# Patient Record
Sex: FEMALE | Race: WHITE | NOT HISPANIC OR LATINO | URBAN - METROPOLITAN AREA
[De-identification: names, ages, dates, MRNs, and addresses within clinical notes are randomized per-mention and may not be internally consistent; named-entity substitution may affect disease eponyms.]

---

## 2018-03-21 ENCOUNTER — OUTPATIENT (OUTPATIENT)
Dept: OUTPATIENT SERVICES | Facility: HOSPITAL | Age: 38
LOS: 1 days | End: 2018-03-21
Payer: MEDICAID

## 2018-03-21 DIAGNOSIS — K40.90 UNILATERAL INGUINAL HERNIA, WITHOUT OBSTRUCTION OR GANGRENE, NOT SPECIFIED AS RECURRENT: ICD-10-CM

## 2018-03-21 DIAGNOSIS — Z01.818 ENCOUNTER FOR OTHER PREPROCEDURAL EXAMINATION: ICD-10-CM

## 2018-03-21 DIAGNOSIS — K43.9 VENTRAL HERNIA WITHOUT OBSTRUCTION OR GANGRENE: ICD-10-CM

## 2018-03-21 PROCEDURE — G0463: CPT

## 2018-03-21 PROCEDURE — 85027 COMPLETE CBC AUTOMATED: CPT

## 2018-03-21 PROCEDURE — 36415 COLL VENOUS BLD VENIPUNCTURE: CPT

## 2018-04-04 ENCOUNTER — OUTPATIENT (OUTPATIENT)
Dept: OUTPATIENT SERVICES | Facility: HOSPITAL | Age: 38
LOS: 1 days | End: 2018-04-04
Payer: MEDICAID

## 2018-04-04 DIAGNOSIS — K43.9 VENTRAL HERNIA WITHOUT OBSTRUCTION OR GANGRENE: ICD-10-CM

## 2018-04-04 DIAGNOSIS — K40.90 UNILATERAL INGUINAL HERNIA, WITHOUT OBSTRUCTION OR GANGRENE, NOT SPECIFIED AS RECURRENT: ICD-10-CM

## 2018-04-04 PROCEDURE — 49560: CPT

## 2018-04-04 PROCEDURE — 88302 TISSUE EXAM BY PATHOLOGIST: CPT

## 2018-04-04 PROCEDURE — C1781: CPT

## 2018-04-04 PROCEDURE — 88302 TISSUE EXAM BY PATHOLOGIST: CPT | Mod: 26

## 2018-04-04 PROCEDURE — 49505 PRP I/HERN INIT REDUC >5 YR: CPT | Mod: LT

## 2022-06-07 ENCOUNTER — INPATIENT (INPATIENT)
Facility: HOSPITAL | Age: 42
LOS: 7 days | Discharge: HOME | End: 2022-06-15
Attending: PSYCHIATRY & NEUROLOGY | Admitting: PSYCHIATRY & NEUROLOGY
Payer: MEDICAID

## 2022-06-07 VITALS
TEMPERATURE: 98 F | HEART RATE: 76 BPM | OXYGEN SATURATION: 100 % | DIASTOLIC BLOOD PRESSURE: 61 MMHG | SYSTOLIC BLOOD PRESSURE: 113 MMHG | RESPIRATION RATE: 16 BRPM

## 2022-06-07 DIAGNOSIS — F32.2 MAJOR DEPRESSIVE DISORDER, SINGLE EPISODE, SEVERE WITHOUT PSYCHOTIC FEATURES: ICD-10-CM

## 2022-06-07 LAB
ALBUMIN SERPL ELPH-MCNC: 4.4 G/DL — SIGNIFICANT CHANGE UP (ref 3.3–5)
ALP SERPL-CCNC: 62 U/L — SIGNIFICANT CHANGE UP (ref 40–120)
ALT FLD-CCNC: 9 U/L — SIGNIFICANT CHANGE UP (ref 4–33)
ANION GAP SERPL CALC-SCNC: 13 MMOL/L — SIGNIFICANT CHANGE UP (ref 7–14)
APAP SERPL-MCNC: <10 UG/ML — LOW (ref 15–25)
AST SERPL-CCNC: 15 U/L — SIGNIFICANT CHANGE UP (ref 4–32)
BASOPHILS # BLD AUTO: 0.06 K/UL — SIGNIFICANT CHANGE UP (ref 0–0.2)
BASOPHILS NFR BLD AUTO: 0.7 % — SIGNIFICANT CHANGE UP (ref 0–2)
BILIRUB SERPL-MCNC: 0.3 MG/DL — SIGNIFICANT CHANGE UP (ref 0.2–1.2)
BUN SERPL-MCNC: 10 MG/DL — SIGNIFICANT CHANGE UP (ref 7–23)
CALCIUM SERPL-MCNC: 9.4 MG/DL — SIGNIFICANT CHANGE UP (ref 8.4–10.5)
CHLORIDE SERPL-SCNC: 106 MMOL/L — SIGNIFICANT CHANGE UP (ref 98–107)
CO2 SERPL-SCNC: 21 MMOL/L — LOW (ref 22–31)
CREAT SERPL-MCNC: 0.73 MG/DL — SIGNIFICANT CHANGE UP (ref 0.5–1.3)
EGFR: 105 ML/MIN/1.73M2 — SIGNIFICANT CHANGE UP
EOSINOPHIL # BLD AUTO: 0.24 K/UL — SIGNIFICANT CHANGE UP (ref 0–0.5)
EOSINOPHIL NFR BLD AUTO: 2.9 % — SIGNIFICANT CHANGE UP (ref 0–6)
ETHANOL SERPL-MCNC: <10 MG/DL — SIGNIFICANT CHANGE UP
FLUAV AG NPH QL: SIGNIFICANT CHANGE UP
FLUBV AG NPH QL: SIGNIFICANT CHANGE UP
GLUCOSE SERPL-MCNC: 91 MG/DL — SIGNIFICANT CHANGE UP (ref 70–99)
HCG SERPL-ACNC: <5 MIU/ML — SIGNIFICANT CHANGE UP
HCT VFR BLD CALC: 36.9 % — SIGNIFICANT CHANGE UP (ref 34.5–45)
HGB BLD-MCNC: 12.3 G/DL — SIGNIFICANT CHANGE UP (ref 11.5–15.5)
IANC: 5.24 K/UL — SIGNIFICANT CHANGE UP (ref 1.8–7.4)
IMM GRANULOCYTES NFR BLD AUTO: 0.2 % — SIGNIFICANT CHANGE UP (ref 0–1.5)
LYMPHOCYTES # BLD AUTO: 2.19 K/UL — SIGNIFICANT CHANGE UP (ref 1–3.3)
LYMPHOCYTES # BLD AUTO: 26.1 % — SIGNIFICANT CHANGE UP (ref 13–44)
MCHC RBC-ENTMCNC: 29.7 PG — SIGNIFICANT CHANGE UP (ref 27–34)
MCHC RBC-ENTMCNC: 33.3 GM/DL — SIGNIFICANT CHANGE UP (ref 32–36)
MCV RBC AUTO: 89.1 FL — SIGNIFICANT CHANGE UP (ref 80–100)
MONOCYTES # BLD AUTO: 0.64 K/UL — SIGNIFICANT CHANGE UP (ref 0–0.9)
MONOCYTES NFR BLD AUTO: 7.6 % — SIGNIFICANT CHANGE UP (ref 2–14)
NEUTROPHILS # BLD AUTO: 5.24 K/UL — SIGNIFICANT CHANGE UP (ref 1.8–7.4)
NEUTROPHILS NFR BLD AUTO: 62.5 % — SIGNIFICANT CHANGE UP (ref 43–77)
NRBC # BLD: 0 /100 WBCS — SIGNIFICANT CHANGE UP
NRBC # FLD: 0 K/UL — SIGNIFICANT CHANGE UP
PLATELET # BLD AUTO: 237 K/UL — SIGNIFICANT CHANGE UP (ref 150–400)
POTASSIUM SERPL-MCNC: 3.9 MMOL/L — SIGNIFICANT CHANGE UP (ref 3.5–5.3)
POTASSIUM SERPL-SCNC: 3.9 MMOL/L — SIGNIFICANT CHANGE UP (ref 3.5–5.3)
PROT SERPL-MCNC: 7.8 G/DL — SIGNIFICANT CHANGE UP (ref 6–8.3)
RBC # BLD: 4.14 M/UL — SIGNIFICANT CHANGE UP (ref 3.8–5.2)
RBC # FLD: 12.2 % — SIGNIFICANT CHANGE UP (ref 10.3–14.5)
RSV RNA NPH QL NAA+NON-PROBE: SIGNIFICANT CHANGE UP
SALICYLATES SERPL-MCNC: <0.3 MG/DL — LOW (ref 15–30)
SARS-COV-2 RNA SPEC QL NAA+PROBE: SIGNIFICANT CHANGE UP
SODIUM SERPL-SCNC: 140 MMOL/L — SIGNIFICANT CHANGE UP (ref 135–145)
TOXICOLOGY SCREEN, DRUGS OF ABUSE, SERUM RESULT: SIGNIFICANT CHANGE UP
TSH SERPL-MCNC: 1.71 UIU/ML — SIGNIFICANT CHANGE UP (ref 0.27–4.2)
WBC # BLD: 8.39 K/UL — SIGNIFICANT CHANGE UP (ref 3.8–10.5)
WBC # FLD AUTO: 8.39 K/UL — SIGNIFICANT CHANGE UP (ref 3.8–10.5)

## 2022-06-07 PROCEDURE — 99285 EMERGENCY DEPT VISIT HI MDM: CPT | Mod: 25

## 2022-06-07 PROCEDURE — 93010 ELECTROCARDIOGRAM REPORT: CPT
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
## 2022-06-07 NOTE — ED BEHAVIORAL HEALTH ASSESSMENT NOTE - PSYCHIATRIC ISSUES AND PLAN (INCLUDE STANDING AND PRN MEDICATION)
to be determined on the unit to be determined on the unit. prns Ativan 1mg po/im q6hrs for agitation

## 2022-06-07 NOTE — ED BEHAVIORAL HEALTH ASSESSMENT NOTE - HPI (INCLUDE ILLNESS QUALITY, SEVERITY, DURATION, TIMING, CONTEXT, MODIFYING FACTORS, ASSOCIATED SIGNS AND SYMPTOMS)
Patient is a 41 year old ,  female, domiciled with 2 children, daughter age 13 and son age 10;  full time  for [employer] (currently on medical leave due to recent surgery); PPH of depression; with 1 prior hospitalizations; no known suicide attempts; no known history of violence or arrests; no active substance abuse or known history of complicated withdrawal; PMH of 2 prior hernia surgeries 5 years ago, and recent bladder surgery for bladder prolapse with mesh placed, 4 weeks ago; Patient was brought in by her friend, at her request, for worsening depression and decline in functioning.     On current evaluation, patient reports that she has a history of depression, treated with antidepressants but stopped 10 years ago, when pregnant with her second child.  Reports mood waxing and waning over the years, has not been on medications.  She has been with her therapist for the past 1 1/2 years.  In January, she sought treatment with a psychiatrist, who recommended Ketamine infusions., which she has been receiving since then, every four weeks.  Reports that today, she called the psychiatrist and told her she was not doing well and requested a Ketamine infusion treatment today, one week early.  She did receive the treatment, then called the therapist to report she didn't feel any better.  Therapist recommended her coming to the hospital due to inability to function.   At present, patient endorses anhedonia, poor sleep, anergia, feels overwhelmed and hopeless, identifies mood of 2/10.  She denies changes in appetite, weight loss or gain.  The patient denies other significant mood symptoms.  Specifically, the patient denies manic symptoms, past and present.  The patient denies auditory or visual hallucinations, and no delusions could be elicited on direct questioning.  The patient denies suicidal ideation, homicidal ideation, intent, or plan.    Collateral: refer to  note

## 2022-06-07 NOTE — ED BEHAVIORAL HEALTH ASSESSMENT NOTE - RISK ASSESSMENT
Low Acute Suicide Risk Chronic risk factors:  psychosocial stressors; ; single parents; recent medical procedure. Protective factors: medication and treatment compliant; ; no suicide attempts; no self-injurious behavior; no hx of aggression/violence; no legal issues; motivated for help; articulate; strong family support; access to health services. No acute risk factors identified
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
## 2022-06-07 NOTE — ED PROVIDER NOTE - PROGRESS NOTE DETAILS
Sign out follow-up: Patient medically clear. Boarding for admission for depression. No acute overnight events. CRISTIAN

## 2022-06-07 NOTE — ED BEHAVIORAL HEALTH ASSESSMENT NOTE - SUMMARY
Patient is a 41 year old ,  female, domiciled with 2 children, daughter age 13 and son age 10;  full time  for [employer] (currently on medical leave due to recent surgery); PPH of depression; with 1 prior hospitalizations; no known suicide attempts; no known history of violence or arrests; no active substance abuse or known history of complicated withdrawal; PMH of 2 prior hernia surgeries 5 years ago, and recent bladder surgery for bladder prolapse with mesh placed, 4 weeks ago; Patient was brought in by her friend, at her request, for worsening depression and decline in functioning.     Patient presents with cardinal symptoms of depression, anhedonia, hopelessness, depressed and anxious mood, with poor sleep and decline in functioning.  Patient receiving ketamine infusions every 4 weeks, last received today.  Patient in agreement for voluntary admission for stabilization of mood.

## 2022-06-07 NOTE — ED BEHAVIORAL HEALTH ASSESSMENT NOTE - DESCRIPTION
crying, cooperative  Vital Signs Last 24 Hrs  T(C): 36.6 (07 Jun 2022 17:44), Max: 36.6 (07 Jun 2022 17:44)  T(F): 97.8 (07 Jun 2022 17:44), Max: 97.8 (07 Jun 2022 17:44)  HR: 76 (07 Jun 2022 17:44) (76 - 76)  BP: 113/61 (07 Jun 2022 17:44) (113/61 - 113/61)  BP(mean): --  RR: 16 (07 Jun 2022 17:44) (16 - 16)  SpO2: 100% (07 Jun 2022 17:44) (100% - 100%) 2 prior hernia repairs 5 years ago; bladder prolapse surgery, mesh placed, 4 weeks ago 41 year old SWF, domiciled with kids, employed as a  with [employer], currently on medical leave

## 2022-06-07 NOTE — ED ADULT NURSE NOTE - OBJECTIVE STATEMENT
Pt arrived to  reporting depression and anxiety. Reports feeling hopeless, denies S/I H/I A/H V/H. Pt wanded for safety, changed into  clothing, personal property collected and logged.

## 2022-06-07 NOTE — ED BEHAVIORAL HEALTH NOTE - BEHAVIORAL HEALTH NOTE
As per request of provider, writer contacted patient’s friend Silas ([phone]) for collateral information. The following information is per friend.  Patient is a 40 YO female domiciled with her 12 YO and 10 YO children. No safety concerns reported for the children and the children are currently with a friend. Friend reports the patient has requested to come to the ED due to having a hard time and feeling like she couldn’t go on. Patient reports to the friend that she is struggling a lot and struggles to get through the day. Patient denied any suicidality and reports she would not herself to the friend. NO hx of Si or HI reported by the friend. Patient endorses feeling hopeless to the friend. Patient was speaking to her therapist today and the therapist also recommended to come to the ED. Patient works as a  for the [employer] but recently had surgery and has been out of work. No medical problems reported by the friend and she reports the patient is covid vaccinated/boosted. Friend unsure if patient needs admission. Writer agreed to keep the friend updated. As per request of provider, writer contacted patient’s friend Silas ([phone]) for collateral information. The following information is per friend.  Patient is a 42 YO female domiciled with her 12 YO and 10 YO children. No safety concerns reported for the children and the children are currently with a friend. Friend reports the patient has requested to come to the ED due to having a hard time and feeling like she couldn’t go on. Patient reports to the friend that she is struggling a lot and struggles to get through the day. Patient denied any suicidality and reports she would not herself to the friend. NO hx of Si or HI reported by the friend. Patient endorses feeling hopeless to the friend. Patient was speaking to her therapist today and the therapist also recommended to come to the ED. Patient works as a  for the [employer] but recently had surgery and has been out of work. No medical problems reported by the friend and she reports the patient is covid vaccinated/boosted. Friend unsure if patient needs admission. Writer agreed to keep the friend updated.    Writer faxed over clinical paper work to [facility] ([phone]). Writer contacted [facility] ([phone]) and spoke with Sadiq. Sadiq reports the medical doctor has to review the clinical paperwork in the morning due to the patient's recent surgery 4 weeks ago and the patient receiving ketamine treatment today. [place] to f/u with treatment in the AM. As per request of provider, writer contacted patient’s friend Silas ([phone]) for collateral information. The following information is per friend.  Patient is a 40 YO female domiciled with her 12 YO and 10 YO children. No safety concerns reported for the children and the children are currently with a friend. Friend reports the patient has requested to come to the ED due to having a hard time and feeling like she couldn’t go on. Patient reports to the friend that she is struggling a lot and struggles to get through the day. Patient denied any suicidality and reports she would not herself to the friend. NO hx of Si or HI reported by the friend. Patient endorses feeling hopeless to the friend. Patient was speaking to her therapist today and the therapist also recommended to come to the ED. Patient works as a  for the [employer] but recently had surgery and has been out of work. No medical problems reported by the friend and she reports the patient is covid vaccinated/boosted. Friend unsure if patient needs admission. Writer agreed to keep the friend updated.    Writer faxed over clinical paper work to [facility] ([phone]). Writer contacted [facility] ([phone]) and spoke with Sadiq. Sadiq reports the medical doctor has to review the clinical paperwork in the morning due to the patient's recent surgery 4 weeks ago and the patient receiving ketamine treatment today. [place] to f/u with treatment in the AM.    Writer informed the friend of patient's admission and that patient would be boarding in the ED overnight.

## 2022-06-07 NOTE — ED BEHAVIORAL HEALTH ASSESSMENT NOTE - DETAILS
informed self referred daughter age 13, son age 10 denies left voicemail message for Dr. Mendelowitz

## 2022-06-07 NOTE — ED ADULT NURSE NOTE - NSIMPLEMENTINTERV_GEN_ALL_ED
Implemented All Universal Safety Interventions:  Juda to call system. Call bell, personal items and telephone within reach. Instruct patient to call for assistance. Room bathroom lighting operational. Non-slip footwear when patient is off stretcher. Physically safe environment: no spills, clutter or unnecessary equipment. Stretcher in lowest position, wheels locked, appropriate side rails in place.

## 2022-06-07 NOTE — ED ADULT TRIAGE NOTE - CHIEF COMPLAINT QUOTE
Downtime:  Pt states she feels hopeless., with increased anxiety. Denies active, SI, HI, hallucination

## 2022-06-08 DIAGNOSIS — F34.1 DYSTHYMIC DISORDER: ICD-10-CM

## 2022-06-08 DIAGNOSIS — F41.1 GENERALIZED ANXIETY DISORDER: ICD-10-CM

## 2022-06-08 DIAGNOSIS — F33.9 MAJOR DEPRESSIVE DISORDER, RECURRENT, UNSPECIFIED: ICD-10-CM

## 2022-06-08 LAB
AMPHET UR-MCNC: NEGATIVE — SIGNIFICANT CHANGE UP
APPEARANCE UR: ABNORMAL
BACTERIA # UR AUTO: ABNORMAL
BARBITURATES UR SCN-MCNC: NEGATIVE — SIGNIFICANT CHANGE UP
BENZODIAZ UR-MCNC: NEGATIVE — SIGNIFICANT CHANGE UP
BILIRUB UR-MCNC: NEGATIVE — SIGNIFICANT CHANGE UP
COCAINE METAB.OTHER UR-MCNC: NEGATIVE — SIGNIFICANT CHANGE UP
COLOR SPEC: SIGNIFICANT CHANGE UP
COVID-19 SPIKE DOMAIN AB INTERP: POSITIVE
COVID-19 SPIKE DOMAIN ANTIBODY RESULT: >250 U/ML — HIGH
CREATININE URINE RESULT, DAU: 68 MG/DL — SIGNIFICANT CHANGE UP
DIFF PNL FLD: ABNORMAL
EPI CELLS # UR: 9 /HPF — HIGH (ref 0–5)
GLUCOSE UR QL: NEGATIVE — SIGNIFICANT CHANGE UP
HYALINE CASTS # UR AUTO: 1 /LPF — SIGNIFICANT CHANGE UP (ref 0–7)
KETONES UR-MCNC: NEGATIVE — SIGNIFICANT CHANGE UP
LEUKOCYTE ESTERASE UR-ACNC: ABNORMAL
METHADONE UR-MCNC: NEGATIVE — SIGNIFICANT CHANGE UP
NITRITE UR-MCNC: NEGATIVE — SIGNIFICANT CHANGE UP
OPIATES UR-MCNC: NEGATIVE — SIGNIFICANT CHANGE UP
OXYCODONE UR-MCNC: NEGATIVE — SIGNIFICANT CHANGE UP
PCP SPEC-MCNC: SIGNIFICANT CHANGE UP
PCP UR-MCNC: NEGATIVE — SIGNIFICANT CHANGE UP
PH UR: 6.5 — SIGNIFICANT CHANGE UP (ref 5–8)
PROT UR-MCNC: NEGATIVE — SIGNIFICANT CHANGE UP
RBC CASTS # UR COMP ASSIST: 2 /HPF — SIGNIFICANT CHANGE UP (ref 0–4)
SARS-COV-2 IGG+IGM SERPL QL IA: >250 U/ML — HIGH
SARS-COV-2 IGG+IGM SERPL QL IA: POSITIVE
SP GR SPEC: 1.01 — SIGNIFICANT CHANGE UP (ref 1–1.05)
THC UR QL: NEGATIVE — SIGNIFICANT CHANGE UP
UROBILINOGEN FLD QL: SIGNIFICANT CHANGE UP
WBC UR QL: 24 /HPF — HIGH (ref 0–5)

## 2022-06-08 PROCEDURE — 99222 1ST HOSP IP/OBS MODERATE 55: CPT

## 2022-06-08 RX ORDER — VENLAFAXINE HCL 75 MG
37.5 CAPSULE, EXT RELEASE 24 HR ORAL DAILY
Refills: 0 | Status: DISCONTINUED | OUTPATIENT
Start: 2022-06-09 | End: 2022-06-09

## 2022-06-08 RX ORDER — LANOLIN ALCOHOL/MO/W.PET/CERES
3 CREAM (GRAM) TOPICAL AT BEDTIME
Refills: 0 | Status: DISCONTINUED | OUTPATIENT
Start: 2022-06-08 | End: 2022-06-15

## 2022-06-08 RX ORDER — INFLUENZA VIRUS VACCINE 15; 15; 15; 15 UG/.5ML; UG/.5ML; UG/.5ML; UG/.5ML
0.5 SUSPENSION INTRAMUSCULAR ONCE
Refills: 0 | Status: DISCONTINUED | OUTPATIENT
Start: 2022-06-08 | End: 2022-06-15

## 2022-06-08 RX ORDER — NITROFURANTOIN MACROCRYSTAL 50 MG
100 CAPSULE ORAL
Refills: 0 | Status: COMPLETED | OUTPATIENT
Start: 2022-06-08 | End: 2022-06-13

## 2022-06-08 NOTE — BH INPATIENT PSYCHIATRY ASSESSMENT NOTE - NSCOMMENTSUICPROTRISKFACT_PSY_ALL_CORE
Patient is engaged in care. Denies SI, no history of suicide attempts. Feels responsible to children.

## 2022-06-08 NOTE — BH CONSULTATION LIAISON PROGRESS NOTE - NSBHFUPINTERVALHXFT_PSY_A_CORE
Pt reports no changes since yesterday. She continues to endorse depression and is seeking voluntary psychiatric admission.

## 2022-06-08 NOTE — BH CONSULTATION LIAISON PROGRESS NOTE - NSBHCHARTREVIEWVS_PSY_A_CORE FT
Vital Signs Last 24 Hrs  T(C): 36.8 (08 Jun 2022 08:44), Max: 37.1 (08 Jun 2022 02:51)  T(F): 98.3 (08 Jun 2022 08:44), Max: 98.7 (08 Jun 2022 02:51)  HR: 87 (08 Jun 2022 08:44) (67 - 87)  BP: 114/68 (08 Jun 2022 08:44) (109/64 - 114/68)  BP(mean): --  RR: 16 (08 Jun 2022 08:44) (16 - 16)  SpO2: 100% (08 Jun 2022 08:44) (99% - 100%)

## 2022-06-08 NOTE — BH INPATIENT PSYCHIATRY ASSESSMENT NOTE - PAST PSYCHOTROPIC MEDICATION
Lithium as adolescent  Lexapro for 1 year (well tolerated but patient did not want to stay on it and discontinued)  Effexor (good response, d/bhavin when pregnant and experienced discontinuation symptoms)

## 2022-06-08 NOTE — BH INPATIENT PSYCHIATRY ASSESSMENT NOTE - NSBHCHARTREVIEWVS_PSY_A_CORE FT
Vital Signs Last 24 Hrs  T(C): 36.8 (06-08-22 @ 08:44), Max: 37.1 (06-08-22 @ 02:51)  T(F): 98.3 (06-08-22 @ 08:44), Max: 98.7 (06-08-22 @ 02:51)  HR: 87 (06-08-22 @ 08:44) (67 - 87)  BP: 114/68 (06-08-22 @ 08:44) (109/64 - 114/68)  BP(mean): --  RR: 16 (06-08-22 @ 08:44) (16 - 16)  SpO2: 100% (06-08-22 @ 08:44) (99% - 100%)

## 2022-06-08 NOTE — BH INPATIENT PSYCHIATRY ASSESSMENT NOTE - CASE SUMMARY
41 year old ,  female, domiciled with 2 children, daughter age 13 and son age 10;  full time  for [employer] (currently on medical leave due to recent surgery); PPH of depression; with 1 prior hospitalizations; no known suicide attempts; no known history of violence or arrests; no active substance abuse or known history of complicated withdrawal; PMH of 2 prior hernia surgeries 5 years ago, and recent bladder surgery for bladder prolapse with mesh placed, 4 weeks ago; Patient was brought in by her friend, at her request, for worsening depression and decline in functioning.     On current evaluation, patient reports that she has a history of depression, treated with antidepressants but stopped 10 years ago, when pregnant with her second child.  Reports mood waxing and waning over the years, has not been on medications.  She has been with her therapist for the past 1 1/2 years.  In January, she sought treatment with a psychiatrist, who recommended Ketamine infusions., which she has been receiving since then, every four weeks.  Reports that today, she called the psychiatrist and told her she was not doing well and requested a Ketamine infusion treatment today, one week early.  She did receive the treatment, then called the therapist to report she didn't feel any better.  Therapist recommended her coming to the hospital due to inability to function.       On initial MSE today the patient is casually dressed and makes fair eye contact.   Speech is clear and of normal rate.   Patient’s thought process with no evidence of a disorder of thought process.   Patient’s thought content with no evidence of delusions or obsessions.   Patient denies hallucinations.   Mood "depressed" affect constricted in the depressed range.    Patient admits to passive thoughts of death and but denies active suicidal ideation, intent and plan.   Patient denies aggressive/homicidal ideation, intent or plan.   Patient is AAO X3. Patient is cognitively grossly intact.   Patient’s insight and judgment are limited.   Patient’s impulse control is intact at this time.      Patient requires acute inpatient care for treatment of depression.  Patient admitted on a 913 voluntary status   Patient does not require constant observation at this time and will follow with routine checks.   Patient’s best past response was years ago on NSRI Venlafaxine  Stopped Venlafaxine abruptly and had severe discontinuation syndrome when she was pregnant with son   Restart Venlafaxine XR at 37.5 mg daily   Treatment will include individual therapy/supportive therapy/ rehab therapy/ psychopharmacological therapy and milieu therapy

## 2022-06-08 NOTE — BH INPATIENT PSYCHIATRY ASSESSMENT NOTE - DESCRIPTION
41 year old SWF, domiciled with kids, employed as a  with [employer], currently on medical leave 41 year old F, domiciled with kids, employed as a  with [employer], currently on medical leave. See HPI

## 2022-06-08 NOTE — ED BEHAVIORAL HEALTH NOTE - BEHAVIORAL HEALTH NOTE
COVID Exposure Screen- Patient  1.	*Have you had a COVID-19 test in the last 90 days?  (  ) Yes   (  x) No   (  ) Unknown- Reason: _____  IF YES PROCEED TO QUESTION #2. IF NO OR UNKNOWN, PLEASE SKIP TO QUESTION #3.  2.	Date of test(s) and result(s): ________  3.	*Have you tested positive for COVID-19 antibodies? (  ) Yes   ( x ) No   (  ) Unknown- Reason: _____  IF YES PROCEED TO QUESTION #4. IF NO or UNKNOWN, PLEASE SKIP TO QUESTION #5.  4.	Date of positive antibody test: ________  5.	*Have you received 2 doses of the COVID-19 vaccine? ( x ) Yes   (  ) No   (  ) Unknown- Reason: _____   IF YES PROCEED TO QUESTION #6. IF NO or UNKNOWN, PLEASE SKIP TO QUESTION #7.  6.	Date of second dose: ________01/2022  7.	*In the past 10 days, have you been around anyone with a positive COVID-19 test?* (  ) Yes   (x  ) No   (  ) Unknown- Reason: ____  IF YES PROCEED TO QUESTION #8. IF NO or UNKNOWN, PLEASE SKIP TO QUESTION #13.  8.	Were you within 6 feet of them for at least 15 minutes? (  ) Yes   (  ) No   (  ) Unknown- Reason: _____  9.	Have you provided care for them? (  ) Yes   (  ) No   (  ) Unknown- Reason: ______  10.	Have you had direct physical contact with them (touched, hugged, or kissed them)? (  ) Yes   (  ) No    (  ) Unknown- Reason: _____  11.	Have you shared eating or drinking utensils with them? (  ) Yes   (  ) No    (  ) Unknown- Reason: ____  12.	Have they sneezed, coughed, or somehow gotten respiratory droplets on you? (  ) Yes   (  ) No    (  ) Unknown- Reason: ______  13.	*Have you been out of [place] within the past 10 days?* (  ) Yes   (x  ) No   (  ) Unknown- Reason: _____  IF YES PLEASE ANSWER THE FOLLOWING QUESTIONS:  14.	Which state/country have you been to? ______  15.	Were you there over 24 hours? (  ) Yes   (  ) No    (  ) Unknown- Reason: ______  16.	Date of return to [facility]: ______     COVID Exposure Screen- collateral (i.e. third-party, chart review, belongings, etc; include EMS and ED staff)  1.	*Has the patient had a COVID-19 test in the last 90 days?  (  ) Yes   (  ) No   (  ) Unknown- Reason: _____  IF YES PROCEED TO QUESTION #2. IF NO OR UNKNOWN, PLEASE SKIP TO QUESTION #3.  2.	Date of test(s) and result(s): ________  3.	*Has the patient tested positive for COVID-19 antibodies? (  ) Yes   (  ) No   (  ) Unknown- Reason: _____  IF YES PROCEED TO QUESTION #4. IF NO or UNKNOWN, PLEASE SKIP TO QUESTION #5.  4.	Date of positive antibody test: ________  5.	*Has the patient received 2 doses of the COVID-19 vaccine? (  ) Yes   (  ) No   (  ) Unknown- Reason: _____  IF YES PROCEED TO QUESTION #6. IF NO or UNKNOWN, PLEASE SKIP TO QUESTION #7.  6.	 Date of second dose: ________  7.	*In the past 10 days, has the patient been around anyone with a positive COVID-19 test?* (  ) Yes   (  ) No   (  ) Unknown- Reason: __  IF YES PROCEED TO QUESTION #8. IF NO or UNKNOWN, PLEASE SKIP TO QUESTION #13.  8.	Was the patient within 6 feet of them for at least 15 minutes? (  ) Yes   (  ) No   (  ) Unknown- Reason: _____  9.	Did the patient provide care for them? (  ) Yes   (  ) No   (  ) Unknown- Reason: ______  10.	Did the patient have direct physical contact with them (touched, hugged, or kissed them)? (  ) Yes   (  ) No    (  ) Unknown- Reason: __  11.	Did the patient share eating or drinking utensils with them? (  ) Yes   (  ) No    (  ) Unknown- Reason: ____  12.	Did they sneeze, cough, or somehow get respiratory droplets on the patient? (  ) Yes   (  ) No    (  ) Unknown- Reason: ______  13.	*Has the patient been out of [place] within the past 10 days?* (  ) Yes   (  ) No   (  ) Unknown- Reason: _____  IF YES PLEASE ANSWER THE FOLLOWING QUESTIONS:  14.	Which state/country did they go to? ______  15.	Were they there over 24 hours? (  ) Yes   (  ) No    (  ) Unknown- Reason: ______  16.	Date of return to [facility]: ______

## 2022-06-08 NOTE — BH INPATIENT PSYCHIATRY ASSESSMENT NOTE - OTHER PAST PSYCHIATRIC HISTORY (INCLUDE DETAILS REGARDING ONSET, COURSE OF ILLNESS, INPATIENT/OUTPATIENT TREATMENT)
1 prior admission, 2005  current psychiatrist, Dr. Madhavi Marroquin ([place])  Therapist- Flor Neves Patient received bipolar diagnosis when 15 which she feels was a misdiagnosis   1 prior admission, 2005  Patient has been on and off medications for several years but none for past 11 yrs until recent introduction of ketamine  current psychiatrist, Dr. Madhavi Marroquin ([place]) works at [facility]  Therapist- Flor Neves - [facility] therapist

## 2022-06-08 NOTE — BH INPATIENT PSYCHIATRY ASSESSMENT NOTE - MODIFICATIONS
Patient seen by me at length in conference room for initial inpatient interview with resident observing.   I was physically present during the service to the patient and personally examined the patient and I was directly involved in the management plan and recommendations of the care provided to the patient.   I have reviewed the admission record and reviewed the patient’s physical examination, review of systems and there are no pertinent positives, and admission labs. I have discussed the case with the resident, and I have reviewed the resident's progress note. I agree with the progress note’s contents and I have made changes as indicated.

## 2022-06-08 NOTE — ED BEHAVIORAL HEALTH NOTE - BEHAVIORAL HEALTH NOTE
Writer received a call from [facility] at [facility] cannot accept patient due to the Ketamine and need for Urology consult in a few days which they cannot transport patient to  urologist.

## 2022-06-08 NOTE — BH INPATIENT PSYCHIATRY ASSESSMENT NOTE - MSE UNSTRUCTURED FT
Mental Status Exam:  On exam today the patient is calm and cooperative with good eye contact. Well-related  Speech is clear and of normal rate. Mildly over productive   Thought process: Linear and goal directed. Overinclusive at times  Thought content: With no evidence of false beliefs or obsessions.  Perception: Denies perceptual disturbances  Mood: Describes as "overwhelmed."  Affect: Anxious, reactive, full range, moments of brightness not always congruent with mood, mildly labile.    SI/HI: Patient denies suicidal ideation, intent or plan. Patient denies active aggressive/homicidal ideation, intent or plan.   Cognition: Patient is Alert and oriented. Cognition grossly intact. Concentration and recall good.  Fund of knowledge: Fair  Memory: Recent and remote intact  Insight and judgment: Fair.   Impulse control: Intact at this time.    Vital signs: Stable.
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
## 2022-06-08 NOTE — BH INPATIENT PSYCHIATRY ASSESSMENT NOTE - CURRENT MEDICATION
MEDICATIONS  (STANDING):  influenza   Vaccine 0.5 milliLiter(s) IntraMuscular once    MEDICATIONS  (PRN):  LORazepam     Tablet 1 milliGRAM(s) Oral every 6 hours PRN Agitation  LORazepam   Injectable 1 milliGRAM(s) IntraMuscular once PRN severe agitation   MEDICATIONS  (STANDING):  influenza   Vaccine 0.5 milliLiter(s) IntraMuscular once  nitrofurantoin monohydrate/macrocrystals (MACROBID) 100 milliGRAM(s) Oral two times a day    MEDICATIONS  (PRN):  LORazepam     Tablet 1 milliGRAM(s) Oral every 6 hours PRN Agitation  LORazepam   Injectable 1 milliGRAM(s) IntraMuscular once PRN severe agitation   MEDICATIONS  (STANDING):  influenza   Vaccine 0.5 milliLiter(s) IntraMuscular once  nitrofurantoin monohydrate/macrocrystals (MACROBID) 100 milliGRAM(s) Oral two times a day    MEDICATIONS  (PRN):  LORazepam     Tablet 1 milliGRAM(s) Oral every 6 hours PRN Agitation  LORazepam   Injectable 1 milliGRAM(s) IntraMuscular once PRN severe agitation  melatonin. 3 milliGRAM(s) Oral at bedtime PRN Insomnia

## 2022-06-08 NOTE — BH INPATIENT PSYCHIATRY ASSESSMENT NOTE - NSBHASSESSSUMMFT_PSY_ALL_CORE
Assessment: Patient is a 41 year old ,  female, domiciled with 2 children, daughter age 13 and son age 10;  full time  for [employer] (currently on medical leave due to recent surgery); PPH of depression; with 1 prior hospitalizations; no known suicide attempts; no known history of violence or arrests; no active substance abuse or known history of complicated withdrawal; PMH of 2 prior hernia surgeries 5 years ago, and recent bladder surgery for bladder prolapse with mesh placed, 4 weeks ago; Patient was brought in by her friend, at her request, for worsening depression and decline in functioning. Patient receiving ketamine infusions every 4 weeks, last received 6/7. On interview, patient is calm, cooperative, well-related. Patient presents with cardinal symptoms of depression, anhedonia, hopelessness, depressed and anxious mood, with poor sleep and decline in functioning. She meets criteria for MDD, likely also meets criteria for TABITHA. Dysthymia also on the differential. Lower suspicion for bipolar disorder.     Plan:   Will start venlafaxine tomorrow morning as patient had responded well to medication in past. Offered mirtazapine but patient would like to not be on 2 medications.  PRN ativan 2mg PO/IM for agitation  Patient has UTI, given surgical history will treat with 5d course of nitrofurantoin.    Patient requires acute inpatient care for treatment of MDD  Patient transferred from [facility] and admitted on a 9.13 voluntary status   Patient does not require constant observation at this time and will follow with routine checks.   Treatment will include individual therapy/supportive therapy/ rehab therapy/ psychopharmacological therapy and milieu therapy
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
## 2022-06-08 NOTE — BH PATIENT PROFILE - FALL HARM RISK - UNIVERSAL INTERVENTIONS
Bed in lowest position, wheels locked, appropriate side rails in place/Call bell, personal items and telephone in reach/Instruct patient to call for assistance before getting out of bed or chair/Non-slip footwear when patient is out of bed/[place] to call system/Physically safe environment - no spills, clutter or unnecessary equipment/Purposeful Proactive Rounding/Room/bathroom lighting operational, light cord in reach

## 2022-06-09 LAB
COVID-19 SPIKE DOMAIN AB INTERP: POSITIVE
COVID-19 SPIKE DOMAIN ANTIBODY RESULT: >250 U/ML — HIGH
CULTURE RESULTS: SIGNIFICANT CHANGE UP
SARS-COV-2 IGG+IGM SERPL QL IA: >250 U/ML — HIGH
SARS-COV-2 IGG+IGM SERPL QL IA: POSITIVE
SPECIMEN SOURCE: SIGNIFICANT CHANGE UP

## 2022-06-09 RX ORDER — VENLAFAXINE HCL 75 MG
75 CAPSULE, EXT RELEASE 24 HR ORAL DAILY
Refills: 0 | Status: DISCONTINUED | OUTPATIENT
Start: 2022-06-10 | End: 2022-06-11

## 2022-06-09 RX ADMIN — Medication 100 MILLIGRAM(S): at 09:59

## 2022-06-09 RX ADMIN — Medication 37.5 MILLIGRAM(S): at 09:59

## 2022-06-09 RX ADMIN — Medication 100 MILLIGRAM(S): at 20:49

## 2022-06-09 NOTE — BH INPATIENT PSYCHIATRY PROGRESS NOTE - NSBHFUPINTERVALHXFT_PSY_A_CORE
Patient case discussed with providing team.  No acute events reported overnight; no PRN's utilized.  The team interviewed the patient this morning. She was calm and cooperative with the team interview. The patient states that today she feels "alright", she is still thinking about the stressors in her life going on "outside of here". She expressed that she is most concerned and stressed about her 10 year old soon. She stresses about his safety and his behavior and is concerned he may have ADHD. She reports also being stressed about her current lack of income- she still has her job but she is not getting paid right now since taking time off after her surgery. The patient states she has multiple coping mechanisms, which include making a daily gratitude list, meditating, walking, doing yoga and exercising with friends, gardening and making art. Although she states that she has not had the same motivation to do these activities. She expressed concern about her "feeling different physically" since having COVID in December of 2021 and is concerned about the long term effects of the infection. She feels that her mood has worsened since having COVID and she began Ketamine infusions in end of Dec or January but she feels that the last 2 treatments were not as effective, thus she believes that "the ketamine [treatment] is just done." The patient states that she has not felt that she has a good support system overall but after coming to the hospital she feels more supported by friends who have offered to help. Patient case discussed with providing team.  No acute events reported overnight; no PRN's utilized.  The team interviewed the patient this morning. She was calm and cooperative with the team interview.   The patient states that today she feels "alright because I'm in here", she is still thinking about the stressors in her life going on "outside of here".   She expressed that she is most concerned and stressed about her 10 year old soon. She stresses about his safety and his behavior and is concerned he may have ADHD. She reports also being stressed about her current lack of income- she still has her job but she is not getting paid right now since taking time off after her surgery. The patient states she has multiple coping mechanisms, which include making a daily gratitude list, meditating, walking, doing yoga and exercising with friends, gardening and making art. Although she states that she has not had the same motivation to do these activities. She expressed concern about her "feeling different physically" since having COVID in December of 2021 and is concerned about the long term effects of the infection.   She feels that her mood has worsened since having COVID and she began Ketamine infusions in end of Dec or January but she feels that the last 2 treatments were not as effective, thus she believes that "the ketamine [treatment] is just done." The patient states that she has not felt that she has a good support system overall but after coming to the hospital she feels more supported by friends who have offered to help.

## 2022-06-09 NOTE — BH TREATMENT PLAN - NSTXDCOPLKINTERSW_PSY_ALL_CORE
SW reviewed EMR, met with pt to get history of illness and tx. SW met with team to assess pt progress and tx options. SW received consent to speak with pts friend and outpt tx.
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
## 2022-06-09 NOTE — BH SOCIAL WORK INITIAL PSYCHOSOCIAL EVALUATION - NSBHHOME_PSY_ALL_CORE
10 yo son, 14 yo daughter with family friend at this time. Will go to fathers home in a few days./Home with children under age 18

## 2022-06-09 NOTE — PSYCHIATRIC REHAB INITIAL EVALUATION - NSBHPRRECOMMEND_PSY_ALL_CORE
Writer met with patient in order to orient patient to unit, provide patient with a unit schedule, orient patient to COVID-19 precautions, and introduce patient to psychiatric rehabilitation staff and department functions. Pt is calm, verbal and cooperative upon approach. Pt reported reason of admission that was due to worsening depression related to unmanageable stressors in her life, including worrying about his son who has behavioral issues and having financial difficulties due to unpaid medical leave. Pt demonstrated fair insight into her sxs and needs of treatment. Writer collaborated with patient to select an appropriate psychiatric rehabilitation goal. Psychiatric rehabilitation staff will continue to engage patient daily in order to develop therapeutic rapport.

## 2022-06-09 NOTE — PSYCHIATRIC REHAB INITIAL EVALUATION - NSBHALCSUBTREAT_PSY_ALL_CORE
As per chart, pt is currently seeing psychiatrist, Dr. Madhavi Marroquin from ketamine clinic and gestalt therapist Flor Neves./Outpatient clinic (specify)

## 2022-06-09 NOTE — BH INPATIENT PSYCHIATRY PROGRESS NOTE - CASE SUMMARY
41 year old ,  female, domiciled with 2 children, daughter age 13 and son age 10;  full time  for [employer] (currently on medical leave due to recent surgery); PPH of depression; with 1 prior hospitalizations; no known suicide attempts; no known history of violence or arrests; no active substance abuse or known history of complicated withdrawal; PMH of 2 prior hernia surgeries 5 years ago, and recent bladder surgery for bladder prolapse with mesh placed, 4 weeks ago; Patient was brought in by her friend, at her request, for worsening depression and decline in functioning.     On current evaluation, patient reports that she has a history of depression, treated with antidepressants but stopped 10 years ago, when pregnant with her second child.  Reports mood waxing and waning over the years, has not been on medications.  She has been with her therapist for the past 1 1/2 years.  In January, she sought treatment with a psychiatrist, who recommended Ketamine infusions., which she has been receiving since then, every four weeks.  Reports that today, she called the psychiatrist and told her she was not doing well and requested a Ketamine infusion treatment today, one week early.  She did receive the treatment, then called the therapist to report she didn't feel any better.  Therapist recommended her coming to the hospital due to inability to function.   6/9 patient remains tearful and helpless and hopeless    Agreed to plan of Venlafaxine trial today at 37.5 mg     PLAN:  Patient requires acute inpatient care for treatment of depression.  Patient admitted on a 913 voluntary status   Patient does not require constant observation at this time and will follow with routine checks.   Patient’s best past response was years ago on NSRI Venlafaxine  Stopped Venlafaxine abruptly and had severe discontinuation syndrome when she was pregnant with son   Restart Venlafaxine XR at 37.5 mg daily   Treatment will include individual therapy/supportive therapy/ rehab therapy/ psychopharmacological therapy and milieu therapy   41 year old ,  female, domiciled with 2 children, daughter age 13 and son age 10;  full time  for [employer] (currently on medical leave due to recent surgery); PPH of depression; with 1 prior hospitalizations; no known suicide attempts; no known history of violence or arrests; no active substance abuse or known history of complicated withdrawal; PMH of 2 prior hernia surgeries 5 years ago, and recent bladder surgery for bladder prolapse with mesh placed, 4 weeks ago; Patient was brought in by her friend, at her request, for worsening depression and decline in functioning.     On current evaluation, patient reports that she has a history of depression, treated with antidepressants but stopped 10 years ago, when pregnant with her second child.  Reports mood waxing and waning over the years, has not been on medications.  She has been with her therapist for the past 1 1/2 years.  In January, she sought treatment with a psychiatrist, who recommended Ketamine infusions., which she has been receiving since then, every four weeks.  Reports that today, she called the psychiatrist and told her she was not doing well and requested a Ketamine infusion treatment today, one week early.  She did receive the treatment, then called the therapist to report she didn't feel any better.  Therapist recommended her coming to the hospital due to inability to function.   6/9 patient remains tearful and helpless and hopeless    Agreed to plan of Venlafaxine trial today at 37.5 mg     PLAN:  Patient requires acute inpatient care for treatment of depression.  Patient admitted on a 913 voluntary status   Patient does not require constant observation at this time and will follow with routine checks.   Patient’s best past response was years ago on NSRI Venlafaxine  Stopped Venlafaxine abruptly and had severe discontinuation syndrome when she was pregnant with son   Venlafaxine XR at 37.5 mg daily   Treatment will include individual therapy/supportive therapy/ rehab therapy/ psychopharmacological therapy and milieu therapy

## 2022-06-09 NOTE — BH INPATIENT PSYCHIATRY PROGRESS NOTE - MSE UNSTRUCTURED FT
Mental Status Exam:  On exam today the patient is calm and cooperative with good eye contact. Well-related  Speech is clear, spontaneous and of normal rate. Mildly over productive   Thought process: Linear and goal directed. Overinclusive at times  Thought content: With no evidence of false beliefs or obsessions.  Perception: Denies perceptual disturbances  Mood: Describes as "overwhelmed."  Affect: Anxious, reactive, full range, moments of brightness not always congruent with mood, mildly labile.    SI/HI: Patient denies suicidal ideation, intent or plan. Patient denies active aggressive/homicidal ideation, intent or plan.   Cognition: Patient is Alert and oriented. Cognition grossly intact. Concentration and recall good.  Fund of knowledge: Fair  Memory: Recent and remote intact  Insight and judgment: Fair.   Impulse control: Intact at this time.    Vital signs: Stable.

## 2022-06-09 NOTE — BH INPATIENT PSYCHIATRY PROGRESS NOTE - CURRENT MEDICATION
MEDICATIONS  (STANDING):  influenza   Vaccine 0.5 milliLiter(s) IntraMuscular once  nitrofurantoin monohydrate/macrocrystals (MACROBID) 100 milliGRAM(s) Oral two times a day  venlafaxine XR 37.5 milliGRAM(s) Oral daily    MEDICATIONS  (PRN):  LORazepam     Tablet 1 milliGRAM(s) Oral every 6 hours PRN Agitation  LORazepam   Injectable 1 milliGRAM(s) IntraMuscular once PRN severe agitation  melatonin. 3 milliGRAM(s) Oral at bedtime PRN Insomnia   MEDICATIONS  (STANDING):  influenza   Vaccine 0.5 milliLiter(s) IntraMuscular once  nitrofurantoin monohydrate/macrocrystals (MACROBID) 100 milliGRAM(s) Oral two times a day    MEDICATIONS  (PRN):  LORazepam     Tablet 1 milliGRAM(s) Oral every 6 hours PRN Agitation  LORazepam   Injectable 1 milliGRAM(s) IntraMuscular once PRN severe agitation  melatonin. 3 milliGRAM(s) Oral at bedtime PRN Insomnia

## 2022-06-09 NOTE — BH TREATMENT PLAN - NSTXDEPRESINTERPR_PSY_ALL_CORE
Patient will benefit from engaging in individual and group sessions in order to identify and utilize coping skills for improved symptom management by day seven. Psychiatric Rehabilitation staff will engage patient daily in order to assist patient in exploring and practicing coping strategies for better sxs management.

## 2022-06-10 PROBLEM — F32.9 MAJOR DEPRESSIVE DISORDER, SINGLE EPISODE, UNSPECIFIED: Chronic | Status: ACTIVE | Noted: 2022-06-07

## 2022-06-10 RX ADMIN — Medication 75 MILLIGRAM(S): at 08:30

## 2022-06-10 RX ADMIN — Medication 100 MILLIGRAM(S): at 20:51

## 2022-06-10 RX ADMIN — Medication 100 MILLIGRAM(S): at 08:30

## 2022-06-10 NOTE — BH INPATIENT PSYCHIATRY PROGRESS NOTE - CURRENT MEDICATION
MEDICATIONS  (STANDING):  influenza   Vaccine 0.5 milliLiter(s) IntraMuscular once  nitrofurantoin monohydrate/macrocrystals (MACROBID) 100 milliGRAM(s) Oral two times a day  venlafaxine XR 75 milliGRAM(s) Oral daily    MEDICATIONS  (PRN):  LORazepam     Tablet 1 milliGRAM(s) Oral every 6 hours PRN Agitation  LORazepam   Injectable 1 milliGRAM(s) IntraMuscular once PRN severe agitation  melatonin. 3 milliGRAM(s) Oral at bedtime PRN Insomnia

## 2022-06-10 NOTE — BH INPATIENT PSYCHIATRY PROGRESS NOTE - CASE SUMMARY
41 year old ,  female, domiciled with 2 children, daughter age 13 and son age 10;  full time  for [employer] (currently on medical leave due to recent surgery); PPH of depression; with 1 prior hospitalizations; no known suicide attempts; no known history of violence or arrests; no active substance abuse or known history of complicated withdrawal; PMH of 2 prior hernia surgeries 5 years ago, and recent bladder surgery for bladder prolapse with mesh placed, 4 weeks ago; Patient was brought in by her friend, at her request, for worsening depression and decline in functioning.     On current evaluation, patient reports that she has a history of depression, treated with antidepressants but stopped 10 years ago, when pregnant with her second child.  Reports mood waxing and waning over the years, has not been on medications.  She has been with her therapist for the past 1 1/2 years.  In January, she sought treatment with a psychiatrist, who recommended Ketamine infusions., which she has been receiving since then, every four weeks.  Reports that today, she called the psychiatrist and told her she was not doing well and requested a Ketamine infusion treatment today, one week early.  She did receive the treatment, then called the therapist to report she didn't feel any better.  Therapist recommended her coming to the hospital due to inability to function.     6/10 patient reports feeling less hopeless but feels its because she is hospitalized     Venlafaxine trial today at 75 mg     PLAN:  Patient requires acute inpatient care for treatment of depression.  Patient admitted on a 913 voluntary status   Patient does not require constant observation at this time and will follow with routine checks.   Patient’s best past response was years ago on NSRI Venlafaxine  Stopped Venlafaxine abruptly and had severe discontinuation syndrome when she was pregnant with son   Venlafaxine XR trial   Treatment will include individual therapy/supportive therapy/ rehab therapy/ psychopharmacological therapy and milieu therapy

## 2022-06-10 NOTE — BH INPATIENT PSYCHIATRY PROGRESS NOTE - NSBHFUPINTERVALHXFT_PSY_A_CORE
Patient case discussed with providing team.  No acute events reported overnight; no PRN's utilized.  The team interviewed the patient this morning. She was calm and cooperative with the team interview.   The patient states that today she feels "alright".  She slept in the day/tv room overnight on a yoga mat because she does not feel comfortable with her current roommate. The patient reports that last night she felt her roommate was being hostile towards her and she overheard her roommate on the phone saying bad things about her which she says makes her feel bad. The patient has been trying to spend more time in common areas in order to avoid the situation and she has been interacting with others. She reports spending some time outside working on the plants as well. The patient states she feels better being in the hospital because she can forget about her problems outside, however she said overall she doesn't feel better than when she first came in.   The patient reports her UTI symptoms resolved. As for the Effexor, patient states she feels "jittery, nervous and giddy".

## 2022-06-10 NOTE — BH INPATIENT PSYCHIATRY PROGRESS NOTE - MSE UNSTRUCTURED FT
Mental Status Exam:  On exam today the patient is calm and cooperative with good eye contact. Well-related  Speech is clear, spontaneous and of normal rate. Mildly over productive   Thought process: Linear and goal directed.   Thought content: With no evidence of false beliefs or obsessions.  Perception: Denies perceptual disturbances  Mood: Describes as "alright."  Affect: animated, reactive, full range, moments of brightness not always congruent with mood, mildly labile.    SI/HI: Patient denies suicidal ideation, intent or plan. Patient denies active aggressive/homicidal ideation, intent or plan.   Cognition: Patient is Alert and oriented. Cognition grossly intact. Concentration and recall good.  Fund of knowledge: Fair  Memory: Recent and remote intact  Insight and judgment: Fair.   Impulse control: Intact at this time.    Vital signs: Stable.

## 2022-06-10 NOTE — BH INPATIENT PSYCHIATRY PROGRESS NOTE - NSBHASSESSSUMMFT_PSY_ALL_CORE
Patient is a 41 year old  female, domiciled with 2 children, daughter age 13 and son age 10;  full time  for [employer] (currently on medical leave due to recent surgery); PPH of depression; with 1 prior hospitalizations; no known suicide attempts; no known history of violence or arrests; no active substance abuse or known history of complicated withdrawal; PMH of 2 prior hernia surgeries 5 years ago, and recent bladder surgery for bladder prolapse with mesh placed, 4 weeks ago; Patient was brought in by her friend, at her request, for worsening depression and decline in functioning.     Patient presents with cardinal symptoms of depression, anhedonia, hopelessness, depressed and anxious mood, with poor sleep and decline in functioning.  Patient receiving ketamine infusions every 4 weeks, last received today.  Patient in agreement for voluntary admission for stabilization of mood.    6/10: Clinical Update  Patient presents to the team calm, cooperative, dressed in personal dress with fair grooming.   The patient has been interacting with others in the unit and utilizing personal coping skills. She reports that her UTI symptoms have resolved. She also reports feeling thirsty, nauseous and having dry mouth as a result of the Effexor but she has been hydrating adequately and eating appropriately. She currently feels uncomfortable in her rooming situation and has requested a room change if possible. She endorses not feeling any better than when she came in although she reports some relief from her worries "outside". She admits that her affect does not accurately reflect her feelings of anxiousness and sadness. Effexor titration to 75mg dose will start today for symptom management.    Plan:  1. Continue Effexor 75mg   2. Add patient to wait list for individual therapy Patient is a 41 year old  female, domiciled with 2 children, daughter age 13 and son age 10;  full time  for [employer] (currently on medical leave due to recent surgery); PPH of depression; with 1 prior hospitalizations; no known suicide attempts; no known history of violence or arrests; no active substance abuse or known history of complicated withdrawal; PMH of 2 prior hernia surgeries 5 years ago, and recent bladder surgery for bladder prolapse with mesh placed, 4 weeks ago; Patient was brought in by her friend, at her request, for worsening depression and decline in functioning.     Patient presents with cardinal symptoms of depression, anhedonia, hopelessness, depressed and anxious mood, with poor sleep and decline in functioning.  Patient receiving ketamine infusions every 4 weeks, last received today.  Patient in agreement for voluntary admission for stabilization of mood.    6/10: Clinical Update  Patient presents to the team calm, cooperative, dressed in personal dress with fair grooming.   The patient has been interacting with others in the unit and utilizing personal coping skills. She reports that her UTI symptoms have resolved. She also reports feeling thirsty, nauseous and having dry mouth as a result of the Effexor but she has been hydrating adequately and eating appropriately. She currently feels uncomfortable in her rooming situation and has requested a room change if possible. She endorses not feeling any better than when she came in although she reports some relief from her worries "outside". She admits that her affect does not accurately reflect her feelings of anxiousness and sadness. Effexor titration to 75mg dose will start today for symptom management.    Plan:  1. Continue Venlafaxine XR 75mg   2. Add patient to wait list for individual therapy

## 2022-06-11 PROCEDURE — 99231 SBSQ HOSP IP/OBS SF/LOW 25: CPT

## 2022-06-11 RX ORDER — VENLAFAXINE HCL 75 MG
112.5 CAPSULE, EXT RELEASE 24 HR ORAL DAILY
Refills: 0 | Status: DISCONTINUED | OUTPATIENT
Start: 2022-06-12 | End: 2022-06-13

## 2022-06-11 RX ADMIN — Medication 75 MILLIGRAM(S): at 09:15

## 2022-06-11 RX ADMIN — Medication 100 MILLIGRAM(S): at 09:15

## 2022-06-11 RX ADMIN — Medication 100 MILLIGRAM(S): at 20:48

## 2022-06-11 NOTE — BH INPATIENT PSYCHIATRY PROGRESS NOTE - NSBHASSESSSUMMFT_PSY_ALL_CORE
Patient is a 41 year old  female, domiciled with 2 children, daughter age 13 and son age 10;  full time  for [employer] (currently on medical leave due to recent surgery); PPH of depression; with 1 prior hospitalizations; no known suicide attempts; no known history of violence or arrests; no active substance abuse or known history of complicated withdrawal; PMH of 2 prior hernia surgeries 5 years ago, and recent bladder surgery for bladder prolapse with mesh placed, 4 weeks ago; Patient was brought in by her friend, at her request, for worsening depression and decline in functioning.     Patient presents with cardinal symptoms of depression, anhedonia, hopelessness, depressed and anxious mood, with poor sleep and decline in functioning.  Patient receiving ketamine infusions every 4 weeks, last received today.  Patient in agreement for voluntary admission for stabilization of mood.    6/11: Clinical Update  Patient remains depressed and anxious and hopeless about her future    Plan:  1. Continue Venlafaxine XR  and increase tomorrow to 112.5 mg   2. Add patient to wait list for individual therapy

## 2022-06-11 NOTE — BH INPATIENT PSYCHIATRY PROGRESS NOTE - NSBHFUPINTERVALHXFT_PSY_A_CORE
Patient seen for follow up of depression  Chart reviewed and case discussed with nursing staff.  No acute events reported overnight; no PRN's utilized.  Feels hopeful that venlafaxine will help but feels her stressors are great

## 2022-06-11 NOTE — BH INPATIENT PSYCHIATRY PROGRESS NOTE - MSE UNSTRUCTURED FT
On exam today the patient is generally cooperative with fair eye contact.    Speech is clear and of normal rate.    Thought process: with no evidence of a disorder of thought process.    Thought content: with no evidence of psychotic beliefs.  Perception: Denies hallucinations.    Mood: Describes as "depressed".  Affect: constricted.    Patient denies active suicidal ideation, intent and plan.    Patient denies active aggressive/homicidal ideation, intent or plan.   Patient is Alert and oriented in all spheres. Patient is cognitively grossly intact.   Insight and judgment are limited. Impulse control is intact at this time.

## 2022-06-12 PROCEDURE — 99231 SBSQ HOSP IP/OBS SF/LOW 25: CPT

## 2022-06-12 RX ADMIN — Medication 100 MILLIGRAM(S): at 20:14

## 2022-06-12 RX ADMIN — Medication 112.5 MILLIGRAM(S): at 08:43

## 2022-06-12 RX ADMIN — Medication 100 MILLIGRAM(S): at 08:43

## 2022-06-12 NOTE — BH INPATIENT PSYCHIATRY PROGRESS NOTE - MSE UNSTRUCTURED FT
On exam today the patient is generally cooperative with fair eye contact.    Speech is clear and of normal rate.    Thought process: with no evidence of a disorder of thought process.    Thought content: with no evidence of psychotic beliefs.  Perception: Denies hallucinations.    Mood: Describes as "still depressed".  Affect: constricted.    Patient with intermittent hopelessness but denies active suicidal ideation, intent and plan.    Patient denies active aggressive/homicidal ideation, intent or plan.   Patient is Alert and oriented in all spheres. Patient is cognitively grossly intact.   Insight and judgment are limited. Impulse control is intact at this time.

## 2022-06-12 NOTE — BH INPATIENT PSYCHIATRY PROGRESS NOTE - CURRENT MEDICATION
MEDICATIONS  (STANDING):  influenza   Vaccine 0.5 milliLiter(s) IntraMuscular once  nitrofurantoin monohydrate/macrocrystals (MACROBID) 100 milliGRAM(s) Oral two times a day  venlafaxine .5 milliGRAM(s) Oral daily    MEDICATIONS  (PRN):  LORazepam     Tablet 1 milliGRAM(s) Oral every 6 hours PRN Agitation  LORazepam   Injectable 1 milliGRAM(s) IntraMuscular once PRN severe agitation  melatonin. 3 milliGRAM(s) Oral at bedtime PRN Insomnia

## 2022-06-12 NOTE — BH INPATIENT PSYCHIATRY PROGRESS NOTE - NSBHASSESSSUMMFT_PSY_ALL_CORE
Patient is a 41 year old  female, domiciled with 2 children, daughter age 13 and son age 10;  full time  for [employer] (currently on medical leave due to recent surgery); PPH of depression; with 1 prior hospitalizations; no known suicide attempts; no known history of violence or arrests; no active substance abuse or known history of complicated withdrawal; PMH of 2 prior hernia surgeries 5 years ago, and recent bladder surgery for bladder prolapse with mesh placed, 4 weeks ago; Patient was brought in by her friend, at her request, for worsening depression and decline in functioning.     Patient presents with cardinal symptoms of depression, anhedonia, hopelessness, depressed and anxious mood, with poor sleep and decline in functioning.  Patient receiving ketamine infusions every 4 weeks, last received today.  Patient in agreement for voluntary admission for stabilization of mood.    6/12: Clinical Update  Patient remains depressed and anxious and intermittent passive SI and  hopelessness    Plan:  1. Continue Venlafaxine XR  112.5 mg   2. Add patient to wait list for individual therapy

## 2022-06-12 NOTE — BH INPATIENT PSYCHIATRY PROGRESS NOTE - NSBHFUPINTERVALHXFT_PSY_A_CORE
Patient seen for follow up of depression  Chart reviewed and case discussed with nursing staff.  No acute events reported overnight; no PRN's utilized.  Describes nausea this AM "like morning sickness" from the venlafaxine but still wants to stay with this dose  Reports helplessness and passive SI but denies active SI and intention

## 2022-06-13 RX ORDER — IBUPROFEN 200 MG
600 TABLET ORAL EVERY 6 HOURS
Refills: 0 | Status: DISCONTINUED | OUTPATIENT
Start: 2022-06-13 | End: 2022-06-15

## 2022-06-13 RX ORDER — VENLAFAXINE HCL 75 MG
75 CAPSULE, EXT RELEASE 24 HR ORAL DAILY
Refills: 0 | Status: DISCONTINUED | OUTPATIENT
Start: 2022-06-13 | End: 2022-06-15

## 2022-06-13 RX ADMIN — Medication 112.5 MILLIGRAM(S): at 11:04

## 2022-06-13 RX ADMIN — Medication 600 MILLIGRAM(S): at 22:37

## 2022-06-13 RX ADMIN — Medication 600 MILLIGRAM(S): at 21:15

## 2022-06-13 RX ADMIN — Medication 100 MILLIGRAM(S): at 08:39

## 2022-06-13 NOTE — BH PSYCHOLOGY - GROUP THERAPY NOTE - NSPSYCHOLGRPDBTGOAL_PSY_A_CORE
reduce mood and affective lability/reduce vulnerability to emotional dysregualation
reduce mood and affective lability/reduce vulnerability to emotional dysregualation

## 2022-06-13 NOTE — BH INPATIENT PSYCHIATRY PROGRESS NOTE - CASE SUMMARY
41 year old ,  female, domiciled with 2 children, daughter age 13 and son age 10;  full time  for [employer] (currently on medical leave due to recent surgery); PPH of depression; with 1 prior hospitalizations; no known suicide attempts; no known history of violence or arrests; no active substance abuse or known history of complicated withdrawal; PMH of 2 prior hernia surgeries 5 years ago, and recent bladder surgery for bladder prolapse with mesh placed, 4 weeks ago; Patient was brought in by her friend, at her request, for worsening depression and decline in functioning.     On current evaluation, patient reports that she has a history of depression, treated with antidepressants but stopped 10 years ago, when pregnant with her second child.  Reports mood waxing and waning over the years, has not been on medications.  She has been with her therapist for the past 1 1/2 years.  In January, she sought treatment with a psychiatrist, who recommended Ketamine infusions., which she has been receiving since then, every four weeks.  Reports that today, she called the psychiatrist and told her she was not doing well and requested a Ketamine infusion treatment today, one week early.  She did receive the treatment, then called the therapist to report she didn't feel any better.  Therapist recommended her coming to the hospital due to inability to function.     6/13 patient reports feeling "off" today and with distractibility and poor sleep but denies racing thoughts and irritability  With hx of ? Bipolar Spectrum sxs years ago will decrease Venlafaxine back to 75 mg     PLAN:  Patient requires acute inpatient care for treatment of depression.  Patient admitted on a 913 voluntary status   Patient does not require constant observation at this time and will follow with routine checks.   Patient’s best past response was years ago on NSRI Venlafaxine  Stopped Venlafaxine abruptly and had severe discontinuation syndrome when she was pregnant with son   Venlafaxine XR trial   Treatment will include individual therapy/supportive therapy/ rehab therapy/ psychopharmacological therapy and milieu therapy

## 2022-06-13 NOTE — BH INPATIENT PSYCHIATRY PROGRESS NOTE - MSE UNSTRUCTURED FT
On exam today the patient is generally cooperative with poor eye contact today, looking off at wall most of interview  Speech is clear and of normal rate.    Thought process: with no evidence of a disorder of thought process.    Thought content: with no evidence of psychotic beliefs.  Perception: Denies hallucinations.    Mood: Describes as "anxious".  Affect: constricted, anxious  Patient with intermittent hopelessness but denies active suicidal ideation, intent and plan.    Patient denies active aggressive/homicidal ideation, intent or plan.   Patient is Alert and oriented in all spheres. Patient is cognitively grossly intact.   Insight and judgment are fair. Impulse control is intact at this time.

## 2022-06-13 NOTE — BH INPATIENT PSYCHIATRY PROGRESS NOTE - CURRENT MEDICATION
MEDICATIONS  (STANDING):  influenza   Vaccine 0.5 milliLiter(s) IntraMuscular once  venlafaxine XR 75 milliGRAM(s) Oral daily    MEDICATIONS  (PRN):  ibuprofen  Tablet. 600 milliGRAM(s) Oral every 6 hours PRN Mild Pain (1 - 3), Moderate Pain (4 - 6)  LORazepam     Tablet 1 milliGRAM(s) Oral every 6 hours PRN Agitation  LORazepam   Injectable 1 milliGRAM(s) IntraMuscular once PRN severe agitation  melatonin. 3 milliGRAM(s) Oral at bedtime PRN Insomnia

## 2022-06-13 NOTE — BH PSYCHOLOGY - GROUP THERAPY NOTE - NSPSYCHOLGRPDBTPT_PSY_A_CORE FT
DBT Group is a group in which patients learn skills to better manage their emotions and behaviors. Group begins with a mindfulness practice so that patients have an opportunity to practice observing their internal and external experiences. Following the mindfulness exercise the group learns new skills in a didactic format. Group today focused on the “emotion regulation” module. Specifically, patients learned “accumulating positives,” which focused on ways to build up positive experiences in the short term to balance out negative experiences in their lives. Patients were given a pleasant activities list for ideas of positive activities they can incorporate into their everyday lives. Patients were asked to commit to pleasant activities they would engage in on the unit today to improve their moods.
DBT Group is a group in which patients learn skills to better manage their emotions and behaviors. Group begins with a mindfulness practice so that patients have an opportunity to practice observing their internal and external experiences.  Following the mindfulness exercise the group learns new skills in a didactic format. Group today focused on the “emotion regulation” module.  Specifically, patients learned the skills of “PLEASE,” or taking care of the mind by taking care of the body. This skill involves maintaining consistent treatment for physical illness, balanced eating, avoidance of mood-altering substances, balanced sleep, and exercise.  provided examples and suggestions of ways to improve these areas, and patients were encouraged to engage in discussion of ways they can prioritize and implement this skill.

## 2022-06-13 NOTE — BH INPATIENT PSYCHIATRY PROGRESS NOTE - NSBHFUPINTERVALHXFT_PSY_A_CORE
Patient seen for follow up of depression. Chart reviewed and case discussed with nursing staff. No acute events reported overnight; no PRN's utilized.  Describes nausea this morning. States her sense of time is changing with the medications and has difficulty explaining this further. Patient also states she had an odd experience when she saw a table in the day room with 4 people and thought there wasn't space at the table but then realized there was an open spot.  Patient with difficulty sleeping last night, states it was because the person doing checks was louder. Feels tired today.  Denies decreased need for sleep, denies irritability and euphoria. Endorses some racing thoughts but states this has been an issue for her in the past as well.   Endorses anxiety related to chaotic events on the unit over the weekend, also worsened after patient had to call various people to make arrangements for her children.  Patient remains worried about going back home and hopeless about managing her life outside the hospital.

## 2022-06-13 NOTE — BH PSYCHOLOGY - GROUP THERAPY NOTE - NSPSYCHOLGRPDBTPT_PSY_A_CORE
stable mood and affect in group/patient showing good behavior control/no self-destructive impulses/behaviors/other...
stable mood and affect in group/patient showing good behavior control/no self-destructive impulses/behaviors/other...

## 2022-06-13 NOTE — BH INPATIENT PSYCHIATRY PROGRESS NOTE - NSBHASSESSSUMMFT_PSY_ALL_CORE
Patient is a 41 year old  female, domiciled with 2 children, daughter age 13 and son age 10;  full time  for [employer] (currently on medical leave due to recent surgery); PPH of depression; with 1 prior hospitalizations; no known suicide attempts; no known history of violence or arrests; no active substance abuse or known history of complicated withdrawal; PMH of 2 prior hernia surgeries 5 years ago, and recent bladder surgery for bladder prolapse with mesh placed, 4 weeks ago; Patient was brought in by her friend, at her request, for worsening depression and decline in functioning.     Patient presents with cardinal symptoms of depression, anhedonia, hopelessness, depressed and anxious mood, with poor sleep and decline in functioning.  Patient receiving ketamine infusions every 4 weeks, last received today.  Patient in agreement for voluntary admission for stabilization of mood.    6/12: Clinical Update: Patient reporting odd effects of venlafaxine including changes to her sense of time. Appears more anxious and spacey today. Denies several symptoms of jane. Unclear if anxiety secondary to SNRI vs anxiety about events on unit and discharge worries vs hypomania. Patient remains depressed and anxious with hopelessness. Will down titrate venlafaxine to 75mg to reduce risk of switch into jane.    Plan:  1. Decreasing Venlafaxine XR to 75mg . Patient already received 112.5 today  2. Individual therapy starting today  3. Discharge when clinically stable. Patient requires inpatient hospitalization for stability and med titration

## 2022-06-13 NOTE — BH PSYCHOLOGY - GROUP THERAPY NOTE - TOKEN PULL-DIAGNOSIS
Primary Diagnosis:  Major depressive disorder [F32.9]        Problem Dx:   Dysthymia [F34.1]      TABITHA (generalized anxiety disorder) [F41.1]      Current severe episode of major depressive disorder without psychotic features without prior episode [F32.2]      
Primary Diagnosis:  Major depressive disorder [F32.9]        Problem Dx:   Dysthymia [F34.1]      TABITHA (generalized anxiety disorder) [F41.1]      Current severe episode of major depressive disorder without psychotic features without prior episode [F32.2]

## 2022-06-14 RX ADMIN — Medication 75 MILLIGRAM(S): at 10:52

## 2022-06-14 RX ADMIN — Medication 600 MILLIGRAM(S): at 12:14

## 2022-06-14 NOTE — BH INPATIENT PSYCHIATRY PROGRESS NOTE - CASE SUMMARY
41 year old ,  female, domiciled with 2 children, daughter age 13 and son age 10;  full time  for [employer] (currently on medical leave due to recent surgery); PPH of depression; with 1 prior hospitalizations; no known suicide attempts; no known history of violence or arrests; no active substance abuse or known history of complicated withdrawal; PMH of 2 prior hernia surgeries 5 years ago, and recent bladder surgery for bladder prolapse with mesh placed, 4 weeks ago; Patient was brought in by her friend, at her request, for worsening depression and decline in functioning.     On current evaluation, patient reports that she has a history of depression, treated with antidepressants but stopped 10 years ago, when pregnant with her second child.  Reports mood waxing and waning over the years, has not been on medications.  She has been with her therapist for the past 1 1/2 years.  In January, she sought treatment with a psychiatrist, who recommended Ketamine infusions., which she has been receiving since then, every four weeks.  Reports that today, she called the psychiatrist and told her she was not doing well and requested a Ketamine infusion treatment today, one week early.  She did receive the treatment, then called the therapist to report she didn't feel any better.  Therapist recommended her coming to the hospital due to inability to function.     6/14 patient reports feeling better today after episode yesterday "off" yesterday but still not herself  reports similar episode years ago when starting venlafaxine  Now on Venlafaxine 75 mg     PLAN:  Patient requires acute inpatient care for treatment of depression.  Patient admitted on a 913 voluntary status   Patient does not require constant observation at this time and will follow with routine checks.   Patient’s best past response was years ago on NSRI Venlafaxine  Stopped Venlafaxine abruptly and had severe discontinuation syndrome when she was pregnant with son   Venlafaxine XR trial   Treatment will include individual therapy/supportive therapy/ rehab therapy/ psychopharmacological therapy and milieu therapy

## 2022-06-14 NOTE — BH DISCHARGE NOTE NURSING/SOCIAL WORK/PSYCH REHAB - PATIENT PORTAL LINK FT
You can access the FollowMyHealth Patient Portal offered by [facility] by registering at the following website: [email or web address]. By joining [facility]’s FollowMyHealth portal, you will also be able to view your health information using other applications (apps) compatible with our system.

## 2022-06-14 NOTE — BH INPATIENT PSYCHIATRY PROGRESS NOTE - NSBHFUPINTERVALHXFT_PSY_A_CORE
Patient seen for follow up of depression. Chart reviewed and case discussed with nursing staff. No acute events reported overnight; no PRN's utilized.  The patient describes feeling very tired this morning, reports she slept "on and off" throughout the night.   Describes nausea this morning. States her sense of time is changing with the medications and has difficulty explaining this further. Patient also states she had an odd experience when she saw a table in the day room with 4 people and thought there wasn't space at the table but then realized there was an open spot.  Patient with difficulty sleeping last night, states it was because the person doing checks was louder. Feels tired today.  Denies decreased need for sleep, denies irritability and euphoria. Endorses some racing thoughts but states this has been an issue for her in the past as well.   Endorses anxiety related to chaotic events on the unit over the weekend, also worsened after patient had to call various people to make arrangements for her children.  Patient remains worried about going back home and hopeless about managing her life outside the hospital.  Patient seen for follow up of depression. Chart reviewed and case discussed with nursing staff. No acute events reported overnight; no PRN's utilized.  The patient describes feeling very tired this morning, reports she slept "on and off" throughout the night. She reports feeling better on the Effexor and states that she is no longer having "bizarre" experiences on it; she denies nausea and any other side effects. The patient states she has been feeling more irritable and not wanting to be around other people as much, which she attributed to "not wanting to deal with their issues". She shared more information regarding "glitchiness" she was seeing two days ago, which she states now resolves and she is no longer experiencing. However, she said that when she was first on Effexor in 2010 (for 6-12 months), she experienced the same symptom which she said got better and resolved. She denies feeling jittery and nauseous, but she endorses feeling tired and sluggish this morning. When asked about how she feels when she is normal, she states, "I don't know what my normal self is" but she did report feeling better now than when she first came to the hospital.  She denies having any suicidal or homicidal ideation.  Patient remains worried about her children. Patient seen for follow up of depression.   Chart reviewed and case discussed with nursing staff.   No acute events reported overnight; no PRN's utilized.  The patient describes feeling very tired this morning, reports she slept "on and off" throughout the night. She reports feeling better on the Effexor and states that she is no longer having "bizarre" experiences on it; she denies nausea and any other side effects. The patient states she has been feeling more irritable and not wanting to be around other people as much, which she attributed to "not wanting to deal with their issues". She shared more information regarding "glitchiness" she was seeing two days ago, which she states now resolves and she is no longer experiencing. However, she said that when she was first on Effexor in 2010 (for 6-12 months), she experienced the same symptom which she said got better and resolved. She denies feeling jittery and nauseous, but she endorses feeling tired and sluggish this morning. When asked about how she feels when she is normal, she states, "I don't know what my normal self is" but she did report feeling better now than when she first came to the hospital.  She denies having any suicidal or homicidal ideation.  Patient remains worried about her children.

## 2022-06-14 NOTE — BH DISCHARGE NOTE NURSING/SOCIAL WORK/PSYCH REHAB - NSDCPRGOAL_PSY_ALL_CORE
Pt has made progress into her psych rehab goal of identifying coping skills to improve her mood. Pt was able to identify practicing Yoga, coloring and planting as her coping strategies to improve her mood Pt reported improvement in her mood, energy level, appetite and sleep. Pt is more future-oriented and looking forward to going back home to spend time with her children. Pt feels that she is better able to manage stressors after discharge with outpatient support and friends’ support.  In this hospitalization, pt has demonstrated medication compliance. Pt denied SI/HI/HI/AH. Pt was in good behavioral control on the unit. Pt was calm and cooperative with staff. Pt was social and visible on the unit. Pt was observed positively interacting peers. Pt was able to verbalize her needs, feelings and reach out to staff for support. Pt was receptive to skill development. Pt attended 90% of psych rehab groups. Pt was engaged in group activities and discussions. Pt utilized group to practice coping skills. Pt’s insight and judgement are fair.

## 2022-06-14 NOTE — BH INPATIENT PSYCHIATRY PROGRESS NOTE - NSBHASSESSSUMMFT_PSY_ALL_CORE
Patient is a 41 year old  female, domiciled with 2 children, daughter age 13 and son age 10;  full time  for [employer] (currently on medical leave due to recent surgery); PPH of depression; with 1 prior hospitalizations; no known suicide attempts; no known history of violence or arrests; no active substance abuse or known history of complicated withdrawal; PMH of 2 prior hernia surgeries 5 years ago, and recent bladder surgery for bladder prolapse with mesh placed, 4 weeks ago; Patient was brought in by her friend, at her request, for worsening depression and decline in functioning.     Patient presents with cardinal symptoms of depression, anhedonia, hopelessness, depressed and anxious mood, with poor sleep and decline in functioning.  Patient receiving ketamine infusions every 4 weeks, last received today.  Patient in agreement for voluntary admission for stabilization of mood.    6/12: Clinical Update: Patient reporting odd effects of venlafaxine including changes to her sense of time. Appears more anxious and spacey today. Denies several symptoms of jane. Unclear if anxiety secondary to SNRI vs anxiety about events on unit and discharge worries vs hypomania. Patient remains depressed and anxious with hopelessness. Will down titrate venlafaxine to 75mg to reduce risk of switch into jane.    Plan:  1. Decreasing Venlafaxine XR to 75mg . Patient already received 112.5 today  2. Individual therapy starting today  3. Discharge when clinically stable. Patient requires inpatient hospitalization for stability and med titration Patient is a 41 year old  female, domiciled with 2 children, daughter age 13 and son age 10;  full time  for [employer] (currently on medical leave due to recent surgery); PPH of depression; with 1 prior hospitalizations; no known suicide attempts; no known history of violence or arrests; no active substance abuse or known history of complicated withdrawal; PMH of 2 prior hernia surgeries 5 years ago, and recent bladder surgery for bladder prolapse with mesh placed, 4 weeks ago; Patient was brought in by her friend, at her request, for worsening depression and decline in functioning.     Patient presents with cardinal symptoms of depression, anhedonia, hopelessness, depressed and anxious mood, with poor sleep and decline in functioning.  Patient receiving ketamine infusions every 4 weeks, last received today.  Patient in agreement for voluntary admission for stabilization of mood.    6/14: Clinical Update: Patient's previously reported odd side effects of venlafaxine have resolved though today she endorses feeling tired. Patient appears improved from yesterday though still appears anxious. Unclear if anxiety secondary to SNRI vs anxiety about events on unit and discharge worries vs hypomania. Patient remains depressed and anxious with continuing concerns about her children. Will down titrate venlafaxine to 75mg to reduce risk of switch into jane.    Plan:  1. Decreasing Venlafaxine XR to 75mg today 6/14  2. Continue with Individual therapy  3. Discharge when clinically stable. Patient requires inpatient hospitalization for stability and med titration

## 2022-06-14 NOTE — BH PSYCHOLOGY - CLINICIAN PSYCHOTHERAPY NOTE - NSTXSUPORTGOAL_PSY_ALL_CORE
Patient will explore available community resources to build support network
Patient will explore available community resources to build support network

## 2022-06-14 NOTE — BH PSYCHOLOGY - CLINICIAN PSYCHOTHERAPY NOTE - NSBHPSYCHOLNARRATIVE_PSY_A_CORE FT
Patient was alert, oriented x4, cooperative, and in control in session. Patient was fairly groomed and casually dressed. Patient denied current suicidal, self-harm, and homicidal ideation, intent, plan, or urges. Patient denied auditory and visual hallucinations. Patient reported mood as "fine" and affect was congruent and full-range. Thought process was logical and goal-directed; thought content was relevant to discussion. No abnormal psychomotor behavior was observed and speech was of normal rate, production, rhythm, and volume. Patient maintained appropriate eye contact. Patient's insight and judgment were fair.    Writer met with patient for individual therapy session at the recommendation of the treatment team. Session focused on building rapport, identifying factors that contributed to hospitalization, and coping ahead for discharge. Patient reported a number of interpersonal, parenting, and medical stressors that led to a decline in functioning. Patient identified that she has engaged in problem solving surrounding several of these factors. Writer worked with patient to identify existing protective factors that can bolster her functioning upon discharge; patient was able to identify that running and confiding in a friend about struggles with her son's disruptive behaviors were helpful coping strategies in the past. Patient then reported that she was feeling "nauseous" and asked to end the session early to rest. Patient agreed to meet for therapy the following day.
Patient was alert, oriented x4, cooperative, and in control in session. Patient was fairly groomed and casually dressed. Patient denied current suicidal, self-harm, and homicidal ideation, intent, plan, or urges. Patient denied auditory and visual hallucinations. Patient reported mood as "OK" and affect was congruent and full-range. Thought process was logical and goal-directed; thought content was relevant to discussion. No abnormal psychomotor behavior was observed and speech was of normal rate, production, rhythm, and volume. Patient maintained appropriate eye contact. Patient's insight and judgment were fair.    Session focused on discharge and safety planning. Patient was able to identify appropriate items for each section of the Patient Safety Plan. Please see Patient Safety Plan for further details. Writer provided psychoeducation about the Self-Soothe, Improve the Moment, and Distract skills. Patient was able to identify specific activities or strategies that she could implement; patient was encouraged to create a self-soothe kit upon discharge to make the resources to facilitate skills use more accessible. Patient reported that she benefited from hospitalization. Patient reported feeling prepared for discharge and believes she can manage her stressors in the community.

## 2022-06-14 NOTE — BH INPATIENT PSYCHIATRY PROGRESS NOTE - MSE UNSTRUCTURED FT
On exam today the patient is generally cooperative with poor eye contact today, looking off at wall most of interview  Speech is clear and of normal rate.    Thought process: with no evidence of a disorder of thought process.    Thought content: with no evidence of psychotic beliefs.  Perception: Denies hallucinations.    Mood: Describes as "anxious".  Affect: constricted, anxious  Patient with intermittent hopelessness but denies active suicidal ideation, intent and plan.    Patient denies active aggressive/homicidal ideation, intent or plan.   Patient is Alert and oriented in all spheres. Patient is cognitively grossly intact.   Insight and judgment are fair. Impulse control is intact at this time.     On exam today the patient is generally cooperative with intermittent eye contact today, often shifted her gave throughout interview  Speech is clear and of normal rate.    Thought process: with no evidence of a disorder of thought process.    Thought content: with no evidence of psychotic beliefs.  Perception: Denies hallucinations.    Mood: Describes as "better".  Affect: constricted, anxious  Patient with intermittent hopelessness but denies active suicidal ideation, intent and plan.    Patient denies active aggressive/homicidal ideation, intent or plan.   Patient is Alert and oriented in all spheres. Patient is cognitively grossly intact.   Insight and judgment are fair. Impulse control is intact at this time.

## 2022-06-14 NOTE — BH DISCHARGE NOTE NURSING/SOCIAL WORK/PSYCH REHAB - NSCDUDCCRISIS_PSY_A_CORE
[facility]  1 [phone]) [facility]LL ([phone])  Text "WELL" to 04740  Website: www.[employer]/.[employer] 1 (060) 021-GNXW (5896) Website: www.safehorizon.org/.National Suicide Prevention Lifeline 6 (316) 298-7139/.  [employer]  1 (271) [employer] (686-0444)/.  [facility]  [phone] [place] [id number] [phone]   Hours:  Monday through Friday from 9 AM to 3 PM/.  [employer]  [employer] - [facility] Crisis Line  [phone], Option 1

## 2022-06-14 NOTE — BH PSYCHOLOGY - CLINICIAN PSYCHOTHERAPY NOTE - NSBHPSYCHOLGOALS_PSY_A_CORE
Improve social/vocational/coping skills/Prevent relapse
Assessment/Improve social/vocational/coping skills

## 2022-06-14 NOTE — BH SAFETY PLAN - ENVIRONMENT SAFETY 1:
Establish routines: have something visual in the house that can help me remember appointments and the kids' chores, which helps me to figure out when I can set aside time for myself

## 2022-06-14 NOTE — BH DISCHARGE NOTE NURSING/SOCIAL WORK/PSYCH REHAB - DISCHARGE INSTRUCTIONS AFTERCARE APPOINTMENTS
In order to check the location, date, or time of your aftercare appointment, please refer to your Discharge Instructions Document given to you upon leaving the hospital.  If you have lost the instructions please call [phone]

## 2022-06-15 VITALS — TEMPERATURE: 97 F | DIASTOLIC BLOOD PRESSURE: 62 MMHG | SYSTOLIC BLOOD PRESSURE: 111 MMHG | HEART RATE: 62 BPM

## 2022-06-15 PROCEDURE — 99239 HOSP IP/OBS DSCHRG MGMT >30: CPT

## 2022-06-15 RX ORDER — VENLAFAXINE HCL 75 MG
1 CAPSULE, EXT RELEASE 24 HR ORAL
Qty: 30 | Refills: 0
Start: 2022-06-15 | End: 2022-07-14

## 2022-06-15 RX ADMIN — Medication 600 MILLIGRAM(S): at 14:38

## 2022-06-15 RX ADMIN — Medication 600 MILLIGRAM(S): at 08:10

## 2022-06-15 RX ADMIN — Medication 75 MILLIGRAM(S): at 08:11

## 2022-06-15 NOTE — BH INPATIENT PSYCHIATRY PROGRESS NOTE - NSTXSUPORTINTERMD_PSY_ALL_CORE
med management and psychotherapy

## 2022-06-15 NOTE — BH INPATIENT PSYCHIATRY PROGRESS NOTE - NSTXSLPPATINTERMD_PSY_ALL_CORE
med management

## 2022-06-15 NOTE — BH INPATIENT PSYCHIATRY PROGRESS NOTE - CASE SUMMARY
41 year old ,  female, domiciled with 2 children, daughter age 13 and son age 10;  full time  for [employer] (currently on medical leave due to recent surgery); PPH of depression; with 1 prior hospitalizations; no known suicide attempts; no known history of violence or arrests; no active substance abuse or known history of complicated withdrawal; PMH of 2 prior hernia surgeries 5 years ago, and recent bladder surgery for bladder prolapse with mesh placed, 4 weeks ago; Patient was brought in by her friend, at her request, for worsening depression and decline in functioning.     On current evaluation, patient reports that she has a history of depression, treated with antidepressants but stopped 10 years ago, when pregnant with her second child.  Reports mood waxing and waning over the years, has not been on medications.  She has been with her therapist for the past 1 1/2 years.  In January, she sought treatment with a psychiatrist, who recommended Ketamine infusions., which she has been receiving since then, every four weeks.  Reports that today, she called the psychiatrist and told her she was not doing well and requested a Ketamine infusion treatment today, one week early.  She did receive the treatment, then called the therapist to report she didn't feel any better.  Therapist recommended her coming to the hospital due to inability to function.     6/15 patient reports feeling better back to herself  Much improved and stable for discharge  On discharge exam today the patient is generally cooperative and makes fair eye contact.   Speech is clear and of normal rate.  Thought process: with no disorder of thought process.   Thought content: with no evidence of delusional beliefs.   Perception: Denies hallucinations.  Mood: Describes as "improved"   Affect: flat.  Patient denies suicidal and aggressive ideation, intent and plan.   AAO X3. Cognitively grossly intact.   Insight and judgment are improved.  Impulse control is intact at this time    Suicide and risk assessment performed prior to discharge.   The patient has a low acute risk and low chronic risk of self-harm and aggression towards others.     Protective factors include denying SI, no SIB, denying HI, good social supports in their family, no substance abuse, no current mood symptoms, no hopelessness, future-oriented in returning to home, no access to firearms.      Risk factors include presenting illness. Immediate risk was minimized by inpatient admission to a safe environment with appropriate supervision and limited access to lethal means.     Future risk was minimized before discharge by treatment of acute episode, maximizing outpatient support, providing relevant patient education, discussing emergency procedures, and ensuring close follow-up.     The patient remains at a low risk of self-harm, and such risk cannot be further ameliorated by continued inpatient treatment and the patient is therefore appropriate for discharge.       There were no behavioral problems on the unit.  Patient did not become agitated and did not require emergent intramuscular medications or seclusion / restraints.  Patient did not self-harm on the unit.      Patient remained actively engaged in treatment.  Patient participated in individual, group, and milieu therapy.  Patient got along appropriately with staff and peers.     Patient did not have any medical problems during this hospitalization.  There were no medical consultations.    A full discussion of the factors that predict treatment success and relapse was held including safety planning.  A discussion of the risks and benefits of patient’s medication was held including a discussion of the metabolic risks and risk of EPS and TD was done     On day of discharge, the patient no longer requires inpatient treatment and care. Patient denies all suicidal and aggressive ideation, intent and plan. Patient denies anxiety symptoms and panic attacks. Patient is not judged to be an acute danger to self or others at this time. Patient will be discharged to home and outpatient follow up.

## 2022-06-15 NOTE — BH INPATIENT PSYCHIATRY PROGRESS NOTE - NSBHFUPINTERVALHXFT_PSY_A_CORE
Patient seen for follow up for depression and discharge day management.  Chart reviewed and case discussed with treatment team.   Greater than 30 minutes pent with patient and charting as part of discharge day management.   No acute events overnight and no PRN's utilized.   Patient states she is feeling better, slept well last night. Endorses good mood and denies hopelessness and SI.  Feels ready for discharge and has an idea of how she will spend her time (yoga, running, arranging schooling for son).   Notes that she will feel more comfortable leaning on support network moving forward.  Denies "glitchiness" and all visual disturbances, states they have not been present several days.  States she had some nausea yesterday with medications but resolved.

## 2022-06-15 NOTE — BH INPATIENT PSYCHIATRY PROGRESS NOTE - NSDCCRITERIA_PSY_ALL_CORE
When clinically stable and CGI improvement of 2 or greater

## 2022-06-15 NOTE — BH INPATIENT PSYCHIATRY PROGRESS NOTE - NSBHCHARTREVIEWVS_PSY_A_CORE FT
Vital Signs Last 24 Hrs  T(C): 36.8 (06-13-22 @ 07:45), Max: 36.8 (06-13-22 @ 07:45)  T(F): 98.3 (06-13-22 @ 07:45), Max: 98.3 (06-13-22 @ 07:45)  HR: --  BP: 116/59 (06-13-22 @ 07:45) (116/59 - 116/59)  BP(mean): 89 (06-13-22 @ 07:45) (89 - 89)  RR: --  SpO2: --    Orthostatic VS  06-12-22 @ 07:45  Lying BP: --/-- HR: --  Sitting BP: 128/61 HR: 67  Standing BP: 116/69 HR: 88  Site: --  Mode: --  
Vital Signs Last 24 Hrs  T(C): 36.3 (06-12-22 @ 07:45), Max: 36.9 (06-11-22 @ 17:41)  T(F): 97.4 (06-12-22 @ 07:45), Max: 98.5 (06-11-22 @ 17:41)  HR: --  BP: --  BP(mean): --  RR: --  SpO2: --    Orthostatic VS  06-12-22 @ 07:45  Lying BP: --/-- HR: --  Sitting BP: 128/61 HR: 67  Standing BP: 116/69 HR: 88  Site: --  Mode: --  
Vital Signs Last 24 Hrs  T(C): 36.9 (06-11-22 @ 17:41), Max: 36.9 (06-11-22 @ 17:41)  T(F): 98.5 (06-11-22 @ 17:41), Max: 98.5 (06-11-22 @ 17:41)  HR: 71 (06-11-22 @ 06:14) (71 - 71)  BP: 117/62 (06-11-22 @ 06:14) (117/62 - 117/62)  BP(mean): --  RR: --  SpO2: --    Orthostatic VS  06-10-22 @ 07:34  Lying BP: 126/71 HR: 73  Sitting BP: 106/60 HR: 88  Standing BP: --/-- HR: --  Site: --  Mode: --  
Vital Signs Last 24 Hrs  T(C): 36.8 (06-09-22 @ 07:38), Max: 36.8 (06-08-22 @ 17:23)  T(F): 98.2 (06-09-22 @ 07:38), Max: 98.3 (06-08-22 @ 17:23)  HR: --  BP: --  BP(mean): --  RR: --  SpO2: --    Orthostatic VS  06-09-22 @ 07:38  Lying BP: 109/58 HR: 86  Sitting BP: 110/64 HR: 108  Standing BP: --/-- HR: --  Site: --  Mode: --  Orthostatic VS  06-08-22 @ 19:45  Lying BP: --/-- HR: --  Sitting BP: 119/81 HR: 88  Standing BP: 109/70 HR: 109  Site: --  Mode: --  Orthostatic VS  06-08-22 @ 17:23  Lying BP: --/-- HR: --  Sitting BP: 144/81 HR: 97  Standing BP: 127/77 HR: 92  Site: --  Mode: --  
Vital Signs Last 24 Hrs  T(C): 36.2 (06-15-22 @ 06:37), Max: 37.1 (06-14-22 @ 17:22)  T(F): 97.2 (06-15-22 @ 06:37), Max: 98.7 (06-14-22 @ 17:22)  HR: 62 (06-15-22 @ 06:37) (62 - 62)  BP: 111/62 (06-15-22 @ 06:37) (111/62 - 111/62)  BP(mean): --  RR: --  SpO2: --    
Vital Signs Last 24 Hrs  T(C): 36.9 (06-14-22 @ 06:26), Max: 36.9 (06-14-22 @ 06:26)  T(F): 98.4 (06-14-22 @ 06:26), Max: 98.4 (06-14-22 @ 06:26)  HR: 65 (06-14-22 @ 06:26) (65 - 65)  BP: 113/60 (06-14-22 @ 06:26) (113/60 - 113/60)  BP(mean): --  RR: --  SpO2: --    
Vital Signs Last 24 Hrs  T(C): 36.4 (06-10-22 @ 07:34), Max: 36.8 (06-09-22 @ 17:16)  T(F): 97.6 (06-10-22 @ 07:34), Max: 98.3 (06-09-22 @ 17:16)  HR: --  BP: --  BP(mean): --  RR: --  SpO2: --    Orthostatic VS  06-10-22 @ 07:34  Lying BP: 126/71 HR: 73  Sitting BP: 106/60 HR: 88  Standing BP: --/-- HR: --  Site: --  Mode: --  Orthostatic VS  06-09-22 @ 17:16  Lying BP: --/-- HR: --  Sitting BP: 117/65 HR: 75  Standing BP: 129/69 HR: 93  Site: --  Mode: --  Orthostatic VS  06-09-22 @ 07:38  Lying BP: 109/58 HR: 86  Sitting BP: 110/64 HR: 108  Standing BP: --/-- HR: --  Site: --  Mode: --  Orthostatic VS  06-08-22 @ 19:45  Lying BP: --/-- HR: --  Sitting BP: 119/81 HR: 88  Standing BP: 109/70 HR: 109  Site: --  Mode: --  Orthostatic VS  06-08-22 @ 17:23  Lying BP: --/-- HR: --  Sitting BP: 144/81 HR: 97  Standing BP: 127/77 HR: 92  Site: --  Mode: --

## 2022-06-15 NOTE — BH INPATIENT PSYCHIATRY PROGRESS NOTE - NSTXPROBSUPORT_PSY_ALL_CORE
SUPPORT SYSTEM, INADEQUATE

## 2022-06-15 NOTE — BH INPATIENT PSYCHIATRY PROGRESS NOTE - NSBHASSESSSUMMFT_PSY_ALL_CORE
Patient is a 41 year old  female, domiciled with 2 children, daughter age 13 and son age 10;  full time  for [employer] (currently on medical leave due to recent surgery); PPH of depression; with 1 prior hospitalizations; no known suicide attempts; no known history of violence or arrests; no active substance abuse or known history of complicated withdrawal; PMH of 2 prior hernia surgeries 5 years ago, and recent bladder surgery for bladder prolapse with mesh placed, 4 weeks ago; Patient was brought in by her friend, at her request, for worsening depression and decline in functioning.     Patient presents with cardinal symptoms of depression, anhedonia, hopelessness, depressed and anxious mood, with poor sleep and decline in functioning.  Patient receiving ketamine infusions every 4 weeks, last received today.  Patient in agreement for voluntary admission for stabilization of mood.    6/15: Clinical Update: Patient feels well on venlafaine 75mg; previously reported odd side effects of venlafaxine have resolved. Pt is sleeping well. Feels more able to confront psychosocial stressors and lean on support when she is discharged. Stable for d/c today.    Plan:  1. C/w Venlafaxine XR 75mg  2. Continue with Individual therapy  3. Discharge when clinically stable.
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
## 2022-06-15 NOTE — BH INPATIENT PSYCHIATRY PROGRESS NOTE - NSTXSUPORTGOAL_PSY_ALL_CORE
Patient will explore available community resources to build support network

## 2022-06-15 NOTE — BH INPATIENT PSYCHIATRY PROGRESS NOTE - MSE UNSTRUCTURED FT
On exam today the patient is generally cooperative with improved eye contact today, more well-related  Speech is clear and of normal rate.    Thought process: with no evidence of a disorder of thought process.    Thought content: with no evidence of psychotic beliefs.  Perception: Denies hallucinations.    Mood: Describes as "better".  Affect: euthymic, reactive, stable, congruent  Patient denies active suicidal ideation, intent and plan.  Patient denies active aggressive/homicidal ideation, intent or plan.   Patient is Alert and oriented in all spheres. Patient is cognitively grossly intact.   Insight and judgment are fair. Impulse control is intact at this time.

## 2022-06-15 NOTE — BH INPATIENT PSYCHIATRY PROGRESS NOTE - NSICDXBHPRIMARYDX_PSY_ALL_CORE
Major depressive disorder   F32.9  

## 2022-06-15 NOTE — BH INPATIENT PSYCHIATRY PROGRESS NOTE - NSCGIIMPROVESX_PSY_ALL_CORE
2 = Much improved - notably better with signficant reduction of symptoms; increase in the level of functioning but some symptoms remain
5 = Minimally worse - slightly worse but may not be clinically meaningful; may represent very little change in basic clinical status or functional capacity
3 = Minimally improved - slightly better with little or no clinically meaningful reduction of symptoms.  Represents very little change in basic clinical status, level of care, or functional capacity.

## 2022-06-15 NOTE — BH INPATIENT PSYCHIATRY PROGRESS NOTE - MODIFICATIONS
Patient interviewed and evaluated with medical student and resident present to follow up on symptoms and the case has been reviewed with the treatment team this morning.   I was physically present during the service to the patient and personally examined the patient and was directly involved in the management and recommendations of the care provided to the patient.  I have reviewed the above note and the mental status examination and I agree with its contents and made modifications as indicated
Patient seen individually for discharge day management. Greater than 30 minutes was spent with patient, staff and charting as part of discharge day management. I was physically present during the service to the patient and personally examined the patient and I was directly involved in the management plan and recommendations of the care provided to the patient.   I have reviewed the resident's progress note and agree with its contents and I have made changes as indicated 
Patient interviewed and evaluated with medical student and resident present to follow up on symptoms and the case has been reviewed with the treatment team this morning.   I was physically present during the service to the patient and personally examined the patient and was directly involved in the management and recommendations of the care provided to the patient.  I have reviewed the above note and the mental status examination and I agree with its contents and made modifications as indicated

## 2022-06-15 NOTE — BH INPATIENT PSYCHIATRY PROGRESS NOTE - NSICDXBHSECONDARYDX_PSY_ALL_CORE
TABITHA (generalized anxiety disorder)   F41.1  Dysthymia   F34.1  

## 2022-06-15 NOTE — BH INPATIENT PSYCHIATRY PROGRESS NOTE - PRN MEDS
MEDICATIONS  (PRN):  ibuprofen  Tablet. 600 milliGRAM(s) Oral every 6 hours PRN Mild Pain (1 - 3), Moderate Pain (4 - 6)  LORazepam     Tablet 1 milliGRAM(s) Oral every 6 hours PRN Agitation  LORazepam   Injectable 1 milliGRAM(s) IntraMuscular once PRN severe agitation  melatonin. 3 milliGRAM(s) Oral at bedtime PRN Insomnia  
MEDICATIONS  (PRN):  LORazepam     Tablet 1 milliGRAM(s) Oral every 6 hours PRN Agitation  LORazepam   Injectable 1 milliGRAM(s) IntraMuscular once PRN severe agitation  melatonin. 3 milliGRAM(s) Oral at bedtime PRN Insomnia  
MEDICATIONS  (PRN):  ibuprofen  Tablet. 600 milliGRAM(s) Oral every 6 hours PRN Mild Pain (1 - 3), Moderate Pain (4 - 6)  LORazepam     Tablet 1 milliGRAM(s) Oral every 6 hours PRN Agitation  LORazepam   Injectable 1 milliGRAM(s) IntraMuscular once PRN severe agitation  melatonin. 3 milliGRAM(s) Oral at bedtime PRN Insomnia  
MEDICATIONS  (PRN):  ibuprofen  Tablet. 600 milliGRAM(s) Oral every 6 hours PRN Mild Pain (1 - 3), Moderate Pain (4 - 6)  LORazepam     Tablet 1 milliGRAM(s) Oral every 6 hours PRN Agitation  LORazepam   Injectable 1 milliGRAM(s) IntraMuscular once PRN severe agitation  melatonin. 3 milliGRAM(s) Oral at bedtime PRN Insomnia  
MEDICATIONS  (PRN):  LORazepam     Tablet 1 milliGRAM(s) Oral every 6 hours PRN Agitation  LORazepam   Injectable 1 milliGRAM(s) IntraMuscular once PRN severe agitation  melatonin. 3 milliGRAM(s) Oral at bedtime PRN Insomnia  

## 2022-06-15 NOTE — BH INPATIENT PSYCHIATRY DISCHARGE NOTE - NSDCCPCAREPLAN_GEN_ALL_CORE_FT
PRINCIPAL DISCHARGE DIAGNOSIS  Diagnosis: Major psychotic depression, single episode  Assessment and Plan of Treatment:

## 2022-06-15 NOTE — BH INPATIENT PSYCHIATRY DISCHARGE NOTE - MODIFICATIONS
Patient seen individually for discharge day management. I was physically present during the service to the patient and personally examined the patient and I was directly involved in the management plan and recommendations of the care provided to the patient.   I have reviewed the resident's discharge summary and agree with its contents and I have made changes as indicated

## 2022-06-15 NOTE — BH INPATIENT PSYCHIATRY PROGRESS NOTE - NSTXPROBSLPPAT_PSY_ALL_CORE
SLEEP PATTERN, DISTURBED

## 2022-06-15 NOTE — BH INPATIENT PSYCHIATRY DISCHARGE NOTE - HOSPITAL COURSE
41 year old , female, domiciled with 2 children, daughter age 13 and son age 10; full time  for [employer] (currently on medical leave due to recent surgery); PPH of depression; with 1 prior hospitalizations; no known suicide attempts; no known history of violence or arrests; no active substance abuse or known history of complicated withdrawal; PMH of 2 prior hernia surgeries 5 years ago, and recent bladder surgery for bladder prolapse with mesh placed, 4 weeks ago; Patient was brought in by her friend, at her request, for worsening depression and decline in functioning.     On current evaluation, patient reports that she has a history of depression, treated with antidepressants but stopped 10 years ago, when pregnant with her second child.  Reports mood waxing and waning over the years, has not been on medications.  She has been with her therapist for the past 1 1/2 years.  In January, she sought treatment with a psychiatrist, who recommended Ketamine infusions., which she has been receiving since then, every four weeks.  Reports that today, she called the psychiatrist and told her she was not doing well and requested a Ketamine infusion treatment today, one week early.  She did receive the treatment, then called the therapist to report she didn't feel any better.  Therapist recommended her coming to the hospital due to inability to function. Upon admission, patient presented with cardinal symptoms of depression, anhedonia, hopelessness, depressed and anxious mood, with poor sleep and decline in functioning. Diagnostically, she met criteria for MDD, likely also met criteria for TABITHA. Dysthymia was also on the differential. Lower suspicion for bipolar disorder.     Patient was started on venlafaxine as she had experienced positive response in the past around 11 years ago and had discontinued medication due to pregnancy. She was titrated up to 75 with good effect and tolerability. The decision was made to not further increase pt's medication as she experienced some jitteriness and "glitchy vision" on 112.5mg. However this resolved and did not appear for several days on 75mg. The patient was made aware of the risks and benefits of each medication and tolerated them well. She has some nausea from the medications but finds it manageable without nausea medication.     Symptoms gradually improved over the course of hospitalization. The patient began to feel less depressed and hopeless. She was able to reach out to friends for support and help which childcare, which had been difficult for her to do in the past. While she remains stressed about various issues in her life, she feels more able to employ positive coping skills and lean on her support network to move forward. Patient remained actively engaged in treatment and participated in individual, group, and milieu therapy. Patient got along appropriately with staff and peers. The patient slept well and maintained ADLs well.    On the day of discharge, the patient’s mood and affect are euthymic. Patient denies depressed mood and anxiety. Patient is not hopeless. Sleep and appetite are also normal (at baseline). Pt’s motor performance and productivity of speech are WNL. Pt denies symptoms of psychosis including AH/VH with no apparent delusions. Patient denies S/H/I/I/P. The patient is looking forward to continuing in outpatient treatment and cites her two children as major protective factors.    In terms of discharge, patient was offered outpatient care and accepted. Patient was provided with extensive psychoeducation on treatment options and motivational counseling targeting healthy life-style. Patient was instructed on actions for crisis situations, understood and agreed to follow instructions for handling crisis, including coming to ER or calling 911 should the patient or her family feel that she is in danger of hurting self or others. Patient also was given Suicide Prevention Lifeline number 3-796-467-3590 and provided with instructions on its use.    Suicidal and aggression risk assessments were performed on the day of discharge. The patient is at elevated chronic risk of self-harm due to an underlying mood disorder. She is not actively suicidal, manic, or inebriated, making her appropriate for less restrictive treatment as an outpatient without need for continued inpatient hospitalization. Risk factors include: psychosocial stressors, poor social support. Protective factors include future orientation, dependents, treatment engagement, med compliance, lack of substance use. Immediate risk was minimized by inpatient admission to a safe environment with appropriate supervision and limited access to lethal means. Future risk was minimized before discharge by treatment of anxiety/depressive symptoms, maximizing outpatient support, providing relevant patient education, and ensuring close follow-up. Patient denies all suicidal and aggressive/homicidal ideation, intent and plan, and, in view of demonstrated clinical improvement, is not judged to be an acute danger to self or others at this time. Although the patient remains at their chronic baseline risk of self-harm, such risk cannot be further ameliorated by continued inpatient treatment and the patient is therefore appropriate for discharge. Patient has made clinically meaningful progress during his hospitalization and has clearly benefited from medications and psychotherapy. On day of discharge, the patient has improved significantly and no longer requires inpatient treatment and care.     Verbal handoff was given to [facility] ([phone]), including discussion of hospital course, treatment, and medications. The patient’s appointment is: 23-Jun-2022 11:00 41 year old , female, domiciled with 2 children, daughter age 13 and son age 10; full time  for [employer] (currently on medical leave due to recent surgery); PPH of depression; with 1 prior hospitalizations; no known suicide attempts; no known history of violence or arrests; no active substance abuse or known history of complicated withdrawal; PMH of 2 prior hernia surgeries 5 years ago, and recent bladder surgery for bladder prolapse with mesh placed, 4 weeks ago; Patient was brought in by her friend, at her request, for worsening depression and decline in functioning.     On current evaluation, patient reports that she has a history of depression, treated with antidepressants but stopped 10 years ago, when pregnant with her second child.  Reports mood waxing and waning over the years, has not been on medications.  She has been with her therapist for the past 1 1/2 years.  In January, she sought treatment with a psychiatrist, who recommended Ketamine infusions., which she has been receiving since then, every four weeks.  Reports that today, she called the psychiatrist and told her she was not doing well and requested a Ketamine infusion treatment today, one week early.  She did receive the treatment, then called the therapist to report she didn't feel any better.  Therapist recommended her coming to the hospital due to inability to function. Upon admission, patient presented with cardinal symptoms of depression, anhedonia, hopelessness, depressed and anxious mood, with poor sleep and decline in functioning. Diagnostically, she met criteria for MDD, likely also met criteria for TABITHA. Dysthymia was also on the differential. Lower suspicion for bipolar disorder.     Patient was started on venlafaxine as she had experienced positive response in the past around 11 years ago and had discontinued medication due to pregnancy. She was titrated up to 75 with good effect and tolerability. The decision was made to not further increase pt's medication as she experienced some jitteriness and "glitchy vision" on 112.5mg. However this resolved and did not appear for several days on 75mg. The patient was made aware of the risks and benefits of each medication and tolerated them well. She has some nausea from the medications but finds it manageable without nausea medication.     Symptoms gradually improved over the course of hospitalization. The patient began to feel less depressed and hopeless. She was able to reach out to friends for support and help which childcare, which had been difficult for her to do in the past. While she remains stressed about various issues in her life, she feels more able to employ positive coping skills and lean on her support network to move forward. Patient remained actively engaged in treatment and participated in individual, group, and milieu therapy. Patient got along appropriately with staff and peers. The patient slept well and maintained ADLs well.    On the day of discharge, the patient’s mood and affect are euthymic. Patient denies depressed mood and anxiety. Patient is not hopeless. Sleep and appetite are also normal (at baseline). Pt’s motor performance and productivity of speech are WNL. Pt denies symptoms of psychosis including AH/VH with no apparent delusions. Patient denies S/H/I/I/P. The patient is looking forward to continuing in outpatient treatment and cites her two children as major protective factors.    In terms of discharge, patient was offered outpatient care and accepted. Patient was provided with extensive psychoeducation on treatment options and motivational counseling targeting healthy life-style. Patient was instructed on actions for crisis situations, understood and agreed to follow instructions for handling crisis, including coming to ER or calling 911 should the patient or her family feel that she is in danger of hurting self or others. Patient also was given Suicide Prevention Lifeline number 1-795-858-1076 and provided with instructions on its use.    Suicidal and aggression risk assessments were performed on the day of discharge. The patient is at elevated chronic risk of self-harm due to an underlying mood disorder. She is not actively suicidal, manic, or inebriated, making her appropriate for less restrictive treatment as an outpatient without need for continued inpatient hospitalization. Risk factors include: psychosocial stressors, poor social support. Protective factors include future orientation, dependents, treatment engagement, med compliance, lack of substance use. Immediate risk was minimized by inpatient admission to a safe environment with appropriate supervision and limited access to lethal means. Future risk was minimized before discharge by treatment of anxiety/depressive symptoms, maximizing outpatient support, providing relevant patient education, and ensuring close follow-up. Patient denies all suicidal and aggressive/homicidal ideation, intent and plan, and, in view of demonstrated clinical improvement, is not judged to be an acute danger to self or others at this time. Although the patient remains at their chronic baseline risk of self-harm, such risk cannot be further ameliorated by continued inpatient treatment and the patient is therefore appropriate for discharge. Patient has made clinically meaningful progress during his hospitalization and has clearly benefited from medications and psychotherapy. On day of discharge, the patient has improved significantly and no longer requires inpatient treatment and care.     Verbal handoff attempted to [facility] ([phone]), but no provider assigned yet; papers faxed to clinic at their instruction. The patient’s appointment is: 23-Jun-2022 11:00

## 2022-06-15 NOTE — BH INPATIENT PSYCHIATRY PROGRESS NOTE - NSCGISEVERILLNESS_PSY_ALL_CORE
5 = Markedly ill - intrusive symptoms that distinctly impair social/occupational function or cause intrusive levels of distress
5 = Markedly ill - intrusive symptoms that distinctly impair social/occupational function or cause intrusive levels of distress
4 = Moderately ill – overt symptoms causing noticeable, but modest, functional impairment or distress; symptom level may warrant medication
5 = Markedly ill - intrusive symptoms that distinctly impair social/occupational function or cause intrusive levels of distress
5 = Markedly ill - intrusive symptoms that distinctly impair social/occupational function or cause intrusive levels of distress
6 = Severely ill - disruptive pathology, behavior and function are frequently influenced by symptoms, may require assistance from others

## 2022-06-15 NOTE — BH INPATIENT PSYCHIATRY PROGRESS NOTE - NSBHMETABOLIC_PSY_ALL_CORE_FT
BMI:   HbA1c:   Glucose:   BP: 114/68 (06-08-22 @ 08:44) (109/64 - 114/68)  Lipid Panel: 
BMI:   HbA1c:   Glucose:   BP: 111/62 (06-15-22 @ 06:37) (111/62 - 116/59)  Lipid Panel: 
BMI:   HbA1c:   Glucose:   BP: 113/60 (06-14-22 @ 06:26) (113/60 - 116/59)  Lipid Panel: 
BMI:   HbA1c:   Glucose:   BP: 116/59 (06-13-22 @ 07:45) (116/59 - 117/62)  Lipid Panel: 
BMI:   HbA1c:   Glucose:   BP: 117/62 (06-11-22 @ 06:14) (117/62 - 117/62)  Lipid Panel: 
BMI:   HbA1c:   Glucose:   BP: 114/68 (06-08-22 @ 08:44) (109/64 - 114/68)  Lipid Panel: 
BMI:   HbA1c:   Glucose:   BP: 117/62 (06-11-22 @ 06:14) (117/62 - 117/62)  Lipid Panel:

## 2022-06-15 NOTE — BH INPATIENT PSYCHIATRY PROGRESS NOTE - NSTXSLPPATGOAL_PSY_ALL_CORE
Be able to sleep for a minimum of six hours daily

## 2022-06-15 NOTE — BH INPATIENT PSYCHIATRY PROGRESS NOTE - NSBHFUPINTERVALCCFT_PSY_A_CORE
"I don't know what my normal self is."
"I  have had so many roommates these last few days!! "  I hope I will be well enough to leave soon
"The medication is affecting my sense of time"
"Every day is horrible."
"I don't feel better now than before I came here."
"I am worried about how I will ever make it outside of the hospital "
"I'm feeling ready to go home."

## 2022-07-15 RX ORDER — VENLAFAXINE HCL 75 MG
1 CAPSULE, EXT RELEASE 24 HR ORAL
Qty: 30 | Refills: 1
Start: 2022-07-15 | End: 2022-09-12

## 2022-08-15 ENCOUNTER — OUTPATIENT (OUTPATIENT)
Dept: OUTPATIENT SERVICES | Facility: HOSPITAL | Age: 42
LOS: 1 days | Discharge: HOME | End: 2022-08-15

## 2022-08-15 PROCEDURE — 99214 OFFICE O/P EST MOD 30 MIN: CPT | Mod: 95

## 2022-08-16 DIAGNOSIS — F32.9 MAJOR DEPRESSIVE DISORDER, SINGLE EPISODE, UNSPECIFIED: ICD-10-CM

## 2022-09-14 ENCOUNTER — OUTPATIENT (OUTPATIENT)
Dept: OUTPATIENT SERVICES | Facility: HOSPITAL | Age: 42
LOS: 1 days | Discharge: STILL ADMITTED OR ADMINISTRATIVE | End: 2022-09-14

## 2022-10-06 DIAGNOSIS — F32.9 MAJOR DEPRESSIVE DISORDER, SINGLE EPISODE, UNSPECIFIED: ICD-10-CM

## 2022-10-14 NOTE — BH INPATIENT PSYCHIATRY PROGRESS NOTE - NSTXCOPEGOAL_PSY_ALL_CORE
I would recommend she keep the appointment as scheduled.   
Patient has an appointment scheduled with   that she is wondering if she should reschedule. Patient went in for scheduled mammogram and based on the results patient will need to be seen to have biopsy. Patient is unsure if she should see  prior to the ultrasound or after.  
Verbalized understanding of message below. Will keep appt as is.   
Identify and utilize 2 coping skills that meet their needs

## 2022-12-28 NOTE — BH PSYCHOLOGY - GROUP THERAPY NOTE - NSPSYCHOLGRPDBTPROB_PSY_A_CORE
Addended by: Nely James on: 12/28/2022 12:43 PM     Modules accepted: Orders
anxiety/depressed mood
anxiety/depressed mood

## 2023-11-07 NOTE — BH INPATIENT PSYCHIATRY PROGRESS NOTE - NSBHINPTBILLING_PSY_ALL_CORE
18747 - Inpatient Low Complexity
87455 - Inpatient Moderate Complexity
87943 - Inpatient Low Complexity
66021 - Inpatient Moderate Complexity
79773 - Inpatient Moderate Complexity
33155 - Hospital Discharge Day Management; more than 30 min
25851 - Inpatient Moderate Complexity
Alert and oriented to person, place and time

## 2024-02-28 NOTE — BH PSYCHOLOGY - CLINICIAN PSYCHOTHERAPY NOTE - NSBHPSYCHOLDISCUSS_PSY_A_CORE
Discussion with collaborating staff took place since patient's last visit
Discussion with collaborating staff took place since patient's last visit
No

## 2024-09-05 NOTE — ED BEHAVIORAL HEALTH ASSESSMENT NOTE - ACCOMPANIED BY
[Normocephalic] : normocephalic [No Supraclavicular Adenopathy] : no supraclavicular adenopathy [No Cervical Adenopathy] : no cervical adenopathy [Examined in the supine and seated position] : examined in the supine and seated position [Symmetrical] : symmetrical [No Nipple Retraction] : no left nipple retraction [No Nipple Discharge] : no left nipple discharge [No Axillary Lymphadenopathy] : no left axillary lymphadenopathy [No Edema] : no edema [No Rashes] : no rashes [No Ulceration] : no ulceration [Breast Nipple Inversion] : nipples not inverted [Breast Nipple Retraction] : nipples not retracted [Breast Nipple Flattening] : nipples not flattened [Breast Nipple Fissures] : nipples not fissured [de-identified] : 11n7 there is a mobile well-circumscribed soft palpable mass measuring approximately 2cm; 3:00 3 cm from the nipple there is a subcentimeter palpable mobile soft mass [de-identified] : 10:00 5 cm from the nipple there is a oval well-circumscribed soft mobile palpable mass measuring 3 cm Family member

## 2024-11-19 NOTE — ED ADULT NURSE NOTE - NSSEPSISSUSPECTED_ED_A_ED
"Patient VM advising that she does see where My Chart shows the Adderall 20mg was sent in to [facility], however she just got off the phone with [facility] and they told patient they have still not received it.  Patient says it will need to be sent again and she doesn't know what else to do about this it appears that \"[facility] is starting to suck [facility]\".  Please resend  "
Called [facility] Mail order spoke with Debbie They do NOT have the prescription.  Please resend  
Please contact [facility] mail order pharmacy as the prescription is showing received in EHR and then inform patient, if the order is not found please let me know so it can be resent. 
No